# Patient Record
Sex: FEMALE | Race: WHITE | NOT HISPANIC OR LATINO | Employment: OTHER | URBAN - METROPOLITAN AREA
[De-identification: names, ages, dates, MRNs, and addresses within clinical notes are randomized per-mention and may not be internally consistent; named-entity substitution may affect disease eponyms.]

---

## 2023-08-12 ENCOUNTER — HOSPITAL ENCOUNTER (OUTPATIENT)
Dept: DATA CONVERSION | Facility: HOSPITAL | Age: 63
Discharge: HOME | End: 2023-08-12

## 2023-08-12 DIAGNOSIS — Z79.899 OTHER LONG TERM (CURRENT) DRUG THERAPY: ICD-10-CM

## 2023-08-12 DIAGNOSIS — R11.0 NAUSEA: ICD-10-CM

## 2023-08-12 DIAGNOSIS — K29.70 GASTRITIS, UNSPECIFIED, WITHOUT BLEEDING: ICD-10-CM

## 2023-08-12 DIAGNOSIS — Z98.61 CORONARY ANGIOPLASTY STATUS: ICD-10-CM

## 2023-08-12 DIAGNOSIS — R10.12 LEFT UPPER QUADRANT PAIN: ICD-10-CM

## 2023-08-12 DIAGNOSIS — G89.29 OTHER CHRONIC PAIN: ICD-10-CM

## 2023-08-12 DIAGNOSIS — R07.9 CHEST PAIN, UNSPECIFIED: ICD-10-CM

## 2023-08-12 LAB
ALBUMIN SERPL-MCNC: 4.4 GM/DL (ref 3.5–5)
ALBUMIN/GLOB SERPL: 1.5 RATIO (ref 1.5–3)
ALP BLD-CCNC: 79 U/L (ref 35–125)
ALT SERPL-CCNC: 5 U/L (ref 5–40)
ANION GAP SERPL CALCULATED.3IONS-SCNC: 10 MMOL/L (ref 0–19)
AST SERPL-CCNC: 21 U/L (ref 5–40)
BACTERIA SPEC CULT: NORMAL
BACTERIA UR QL AUTO: ABNORMAL
BASOPHILS # BLD AUTO: 0.06 K/UL (ref 0–0.22)
BASOPHILS NFR BLD AUTO: 0.8 % (ref 0–1)
BILIRUB SERPL-MCNC: 0.5 MG/DL (ref 0.1–1.2)
BILIRUB UR QL STRIP.AUTO: NEGATIVE
BUN SERPL-MCNC: 18 MG/DL (ref 8–25)
BUN/CREAT SERPL: 15 RATIO (ref 8–21)
CALCIUM SERPL-MCNC: 9.5 MG/DL (ref 8.5–10.4)
CC # UR: NORMAL /UL
CHLORIDE SERPL-SCNC: 102 MMOL/L (ref 97–107)
CLARITY UR: CLEAR
CO2 SERPL-SCNC: 26 MMOL/L (ref 24–31)
COLOR UR: COLORLESS
CREAT SERPL-MCNC: 1.2 MG/DL (ref 0.4–1.6)
DEPRECATED RDW RBC AUTO: 39.5 FL (ref 37–54)
DIFFERENTIAL METHOD BLD: ABNORMAL
EOSINOPHIL # BLD AUTO: 0.25 K/UL (ref 0–0.45)
EOSINOPHIL NFR BLD: 3.3 % (ref 0–3)
EPITH CASTS #/AREA UR COMP ASSIST: ABNORMAL /HPF
ERYTHROCYTE [DISTWIDTH] IN BLOOD BY AUTOMATED COUNT: 11.9 % (ref 11.7–15)
GFR SERPL CREATININE-BSD FRML MDRD: 51 ML/MIN/1.73 M2
GLOBULIN SER-MCNC: 2.9 G/DL (ref 1.9–3.7)
GLUCOSE SERPL-MCNC: 139 MG/DL (ref 65–99)
GLUCOSE UR STRIP.AUTO-MCNC: NEGATIVE MG/DL
HCT VFR BLD AUTO: 42.6 % (ref 36–44)
HGB BLD-MCNC: 14.4 GM/DL (ref 12–15)
HGB UR QL STRIP.AUTO: ABNORMAL /HPF (ref 0–3)
HGB UR QL: NEGATIVE
HS TROPONIN T DELTA: 1 (ref 0–4)
HS TROPONIN T DELTA: NORMAL (ref 0–4)
IMM GRANULOCYTES # BLD AUTO: 0.02 K/UL (ref 0–0.1)
KETONES UR QL STRIP.AUTO: NEGATIVE
LEUKOCYTE ESTERASE UR QL STRIP.AUTO: ABNORMAL
LIPASE SERPL-CCNC: 84 U/L (ref 16–63)
LYMPHOCYTES # BLD AUTO: 2.38 K/UL (ref 1.2–3.2)
LYMPHOCYTES NFR BLD MANUAL: 31.4 % (ref 20–40)
MCH RBC QN AUTO: 30.4 PG (ref 26–34)
MCHC RBC AUTO-ENTMCNC: 33.8 % (ref 31–37)
MCV RBC AUTO: 89.9 FL (ref 80–100)
MICROSCOPIC (UA): ABNORMAL
MONOCYTES # BLD AUTO: 0.57 K/UL (ref 0–0.8)
MONOCYTES NFR BLD MANUAL: 7.5 % (ref 0–8)
NEUTROPHILS # BLD AUTO: 4.31 K/UL
NEUTROPHILS # BLD AUTO: 4.31 K/UL (ref 1.8–7.7)
NEUTROPHILS.IMMATURE NFR BLD: 0.3 % (ref 0–1)
NEUTS SEG NFR BLD: 56.7 % (ref 50–70)
NITRITE UR QL STRIP.AUTO: NEGATIVE
NRBC BLD-RTO: 0 /100 WBC
PH UR STRIP.AUTO: 6 [PH] (ref 4.6–8)
PLATELET # BLD AUTO: 281 K/UL (ref 150–450)
PMV BLD AUTO: 11.5 CU (ref 7–12.6)
POTASSIUM SERPL-SCNC: 4.1 MMOL/L (ref 3.4–5.1)
PROT SERPL-MCNC: 7.3 G/DL (ref 5.9–7.9)
PROT UR STRIP.AUTO-MCNC: NEGATIVE MG/DL
RBC # BLD AUTO: 4.74 M/UL (ref 4–4.9)
REPORT STATUS -LH SQ DATA CONVERSION: NORMAL
SERVICE CMNT-IMP: NORMAL
SODIUM SERPL-SCNC: 138 MMOL/L (ref 133–145)
SP GR UR STRIP.AUTO: 1.01 (ref 1–1.03)
SPECIMEN SOURCE: NORMAL
TROPONIN T SERPL-MCNC: 6 NG/L
TROPONIN T SERPL-MCNC: 7 NG/L
URINE CULTURE: ABNORMAL
UROBILINOGEN UR QL STRIP.AUTO: NORMAL MG/DL (ref 0–1)
WBC # BLD AUTO: 7.6 K/UL (ref 4.5–11)
WBC #/AREA URNS AUTO: ABNORMAL /HPF (ref 0–3)

## 2023-10-26 PROBLEM — E53.8 COBALAMIN DEFICIENCY: Status: ACTIVE | Noted: 2019-12-10

## 2023-10-26 PROBLEM — R92.8 ABNORMAL MAMMOGRAM: Status: ACTIVE | Noted: 2020-11-27

## 2023-10-26 PROBLEM — L98.8 SKIN LESION OF BREAST: Status: ACTIVE | Noted: 2021-02-19

## 2023-10-26 PROBLEM — R22.9 LOCALIZED SOFT TISSUE SWELLING: Status: ACTIVE | Noted: 2019-06-07

## 2023-10-26 PROBLEM — J30.2 SEASONAL ALLERGIES: Status: ACTIVE | Noted: 2023-10-26

## 2023-10-26 PROBLEM — H66.93 BOM (BILATERAL OTITIS MEDIA): Status: ACTIVE | Noted: 2020-01-02

## 2023-10-26 PROBLEM — E78.5 HYPERLIPIDEMIA: Status: ACTIVE | Noted: 2023-10-26

## 2023-10-26 PROBLEM — M75.50 SUBSCAPULAR BURSITIS: Status: ACTIVE | Noted: 2022-02-14

## 2023-10-26 PROBLEM — N39.0 ACUTE LOWER URINARY TRACT INFECTION: Status: ACTIVE | Noted: 2023-10-26

## 2023-10-26 PROBLEM — R73.09 INCREASED GLUCOSE LEVEL: Status: ACTIVE | Noted: 2020-08-06

## 2023-10-26 PROBLEM — J02.9 SORE THROAT: Status: ACTIVE | Noted: 2020-01-02

## 2023-10-26 PROBLEM — E78.2 MIXED HYPERLIPIDEMIA: Status: ACTIVE | Noted: 2020-04-21

## 2023-10-26 PROBLEM — N39.0 ACUTE UTI: Status: ACTIVE | Noted: 2018-05-28

## 2023-10-26 PROBLEM — R07.81 RIB PAIN ON LEFT SIDE: Status: ACTIVE | Noted: 2021-03-03

## 2023-10-26 PROBLEM — M54.6 THORACIC BACK PAIN: Status: ACTIVE | Noted: 2022-02-14

## 2023-10-26 PROBLEM — M27.8 JAW MASS: Status: ACTIVE | Noted: 2022-10-04

## 2023-10-26 PROBLEM — M75.51 ACUTE BURSITIS OF RIGHT SHOULDER: Status: ACTIVE | Noted: 2022-02-15

## 2023-10-26 PROBLEM — M54.9 BACKACHE: Status: ACTIVE | Noted: 2023-10-26

## 2023-10-26 PROBLEM — E53.9 VITAMIN B DEFICIENCY: Status: ACTIVE | Noted: 2021-10-11

## 2023-10-26 PROBLEM — N89.8 VAGINAL DISCHARGE: Status: ACTIVE | Noted: 2021-08-04

## 2023-10-26 PROBLEM — S69.90XA INJURY OF WRIST: Status: ACTIVE | Noted: 2023-10-26

## 2023-10-26 PROBLEM — H10.9 BACTERIAL CONJUNCTIVITIS OF LEFT EYE: Status: ACTIVE | Noted: 2023-07-17

## 2023-10-26 PROBLEM — R05.9 COUGH: Status: ACTIVE | Noted: 2020-01-02

## 2023-10-26 PROBLEM — N76.2 VULVITIS: Status: ACTIVE | Noted: 2023-03-08

## 2023-10-26 PROBLEM — E66.9 OBESITY: Status: ACTIVE | Noted: 2023-10-26

## 2023-10-26 PROBLEM — M54.50 LUMBAR PAIN: Status: ACTIVE | Noted: 2021-03-03

## 2023-10-26 PROBLEM — R07.89 CHEST PAIN, ATYPICAL: Status: ACTIVE | Noted: 2023-10-26

## 2023-10-26 PROBLEM — J02.9 PHARYNGITIS: Status: ACTIVE | Noted: 2023-04-11

## 2023-10-26 PROBLEM — E55.9 VITAMIN D DEFICIENCY: Status: ACTIVE | Noted: 2019-12-10

## 2023-10-26 PROBLEM — M79.605 LEG PAIN, LEFT: Status: ACTIVE | Noted: 2021-08-04

## 2023-10-26 PROBLEM — J32.9 SINUSITIS: Status: ACTIVE | Noted: 2018-10-10

## 2023-10-26 PROBLEM — R82.90 ABNORMAL URINE FINDING: Status: ACTIVE | Noted: 2018-05-25

## 2023-10-26 PROBLEM — R79.0 LOW MAGNESIUM LEVELS: Status: ACTIVE | Noted: 2021-10-11

## 2023-10-26 PROBLEM — K52.9 ACUTE GASTROENTERITIS: Status: ACTIVE | Noted: 2018-05-28

## 2023-10-26 RX ORDER — TRIAMCINOLONE ACETONIDE 55 UG/1
1 SPRAY, METERED NASAL DAILY
COMMUNITY
Start: 2018-08-14 | End: 2023-12-15 | Stop reason: ALTCHOICE

## 2023-10-26 RX ORDER — OMEPRAZOLE 40 MG/1
CAPSULE, DELAYED RELEASE ORAL
COMMUNITY
Start: 2021-10-11 | End: 2024-03-04 | Stop reason: ALTCHOICE

## 2023-10-26 RX ORDER — OFLOXACIN 3 MG/ML
2 SOLUTION/ DROPS OPHTHALMIC 4 TIMES DAILY
COMMUNITY
Start: 2023-07-17 | End: 2023-12-15 | Stop reason: ALTCHOICE

## 2023-10-26 RX ORDER — CETIRIZINE HYDROCHLORIDE 10 MG/1
1 TABLET ORAL DAILY
COMMUNITY
Start: 2015-10-28 | End: 2024-03-04 | Stop reason: ALTCHOICE

## 2023-10-26 RX ORDER — ROSUVASTATIN CALCIUM 10 MG/1
10 TABLET, COATED ORAL DAILY
COMMUNITY
Start: 2023-04-26 | End: 2024-03-04 | Stop reason: SDUPTHER

## 2023-10-30 ENCOUNTER — OFFICE VISIT (OUTPATIENT)
Dept: PRIMARY CARE | Facility: CLINIC | Age: 63
End: 2023-10-30
Payer: COMMERCIAL

## 2023-10-30 VITALS
DIASTOLIC BLOOD PRESSURE: 100 MMHG | OXYGEN SATURATION: 97 % | BODY MASS INDEX: 31.98 KG/M2 | WEIGHT: 192.2 LBS | HEART RATE: 73 BPM | SYSTOLIC BLOOD PRESSURE: 156 MMHG

## 2023-10-30 DIAGNOSIS — I10 HYPERTENSION, UNSPECIFIED TYPE: Primary | ICD-10-CM

## 2023-10-30 PROCEDURE — 1036F TOBACCO NON-USER: CPT | Performed by: STUDENT IN AN ORGANIZED HEALTH CARE EDUCATION/TRAINING PROGRAM

## 2023-10-30 PROCEDURE — 3077F SYST BP >= 140 MM HG: CPT | Performed by: STUDENT IN AN ORGANIZED HEALTH CARE EDUCATION/TRAINING PROGRAM

## 2023-10-30 PROCEDURE — 99214 OFFICE O/P EST MOD 30 MIN: CPT | Performed by: STUDENT IN AN ORGANIZED HEALTH CARE EDUCATION/TRAINING PROGRAM

## 2023-10-30 PROCEDURE — 3080F DIAST BP >= 90 MM HG: CPT | Performed by: STUDENT IN AN ORGANIZED HEALTH CARE EDUCATION/TRAINING PROGRAM

## 2023-10-30 RX ORDER — LOSARTAN POTASSIUM 25 MG/1
25 TABLET ORAL DAILY
Qty: 30 TABLET | Refills: 1 | Status: SHIPPED | OUTPATIENT
Start: 2023-10-30 | End: 2023-11-15 | Stop reason: DRUGHIGH

## 2023-10-30 ASSESSMENT — PATIENT HEALTH QUESTIONNAIRE - PHQ9
2. FEELING DOWN, DEPRESSED OR HOPELESS: NOT AT ALL
SUM OF ALL RESPONSES TO PHQ9 QUESTIONS 1 AND 2: 0
1. LITTLE INTEREST OR PLEASURE IN DOING THINGS: NOT AT ALL

## 2023-10-30 ASSESSMENT — PAIN SCALES - GENERAL: PAINLEVEL: 0-NO PAIN

## 2023-10-30 NOTE — PROGRESS NOTES
GENERAL PROGRESS NOTE    Chief Complaint   Patient presents with    Hypertension     Went to dentist and states her BP was elevated        HPI  Nell Goff is a 63 y.o. female here today for BP check. Was seen at dentist office last week and was elevated 150/100. Has had some borderline BP's in office as well past few times. Does have off and on HA but no change in that at all. About 5-6 years ago was started on antihypertensives, not sure which one. Was on for a short time but BP went too low and was making dizzy so was stopped.     Vital signs   BP (!) 156/100   Pulse 73   Wt 87.2 kg (192 lb 3.2 oz)   SpO2 97%   BMI 31.98 kg/m²      Current Outpatient Medications   Medication Sig Dispense Refill    omeprazole (PriLOSEC) 40 mg DR capsule 0 Refill(s), Type: Maintenance      rosuvastatin (Crestor) 10 mg tablet Take 1 tablet (10 mg) by mouth once daily.      cetirizine (ZyrTEC) 10 mg tablet Take 1 tablet (10 mg) by mouth once daily.      ofloxacin (Ocuflox) 0.3 % ophthalmic solution Administer 2 drops into the left eye 4 times a day.      triamcinolone (Nasacort) 55 mcg nasal inhaler Administer 1 spray into each nostril once daily.       No current facility-administered medications for this visit.       Review of Systems   Constitutional:  Negative for chills and fever.   Eyes:  Negative for visual disturbance.   Respiratory:  Negative for shortness of breath.    Cardiovascular:  Negative for chest pain, palpitations and leg swelling.   Neurological:  Negative for dizziness and headaches.        Physical Exam  Vitals and nursing note reviewed.   Constitutional:       General: She is not in acute distress.     Appearance: She is not ill-appearing.   Cardiovascular:      Rate and Rhythm: Normal rate and regular rhythm.      Heart sounds: Normal heart sounds. No murmur heard.  Pulmonary:       Effort: Pulmonary effort is normal. No respiratory distress.      Breath sounds: Normal breath sounds. No wheezing, rhonchi or rales.   Musculoskeletal:      Right lower leg: No edema.      Left lower leg: No edema.         Assessment/Plan   1. Hypertension, unspecified type  Needs better control. Add losartan. Medication indications, use and potential side effects discussed in detail. Begin BP monitoring at home, proper technique reviewed. Follow up 2 weeks for BP check.   - Miscellaneous DME  - losartan (Cozaar) 25 mg tablet; Take 1 tablet (25 mg) by mouth once daily.  Dispense: 30 tablet; Refill: 1  - Basic metabolic panel; Future      Mariah Bates MD  10/30/23  3:02 PM

## 2023-11-06 ASSESSMENT — ENCOUNTER SYMPTOMS
SHORTNESS OF BREATH: 0
PALPITATIONS: 0
CHILLS: 0
FEVER: 0
DIZZINESS: 0
HEADACHES: 0

## 2023-11-15 ENCOUNTER — OFFICE VISIT (OUTPATIENT)
Dept: PRIMARY CARE | Facility: CLINIC | Age: 63
End: 2023-11-15
Payer: COMMERCIAL

## 2023-11-15 VITALS
OXYGEN SATURATION: 97 % | BODY MASS INDEX: 31.75 KG/M2 | WEIGHT: 190.8 LBS | DIASTOLIC BLOOD PRESSURE: 96 MMHG | HEART RATE: 76 BPM | SYSTOLIC BLOOD PRESSURE: 142 MMHG

## 2023-11-15 DIAGNOSIS — I10 HYPERTENSION, UNSPECIFIED TYPE: Primary | ICD-10-CM

## 2023-11-15 PROCEDURE — 3077F SYST BP >= 140 MM HG: CPT | Performed by: STUDENT IN AN ORGANIZED HEALTH CARE EDUCATION/TRAINING PROGRAM

## 2023-11-15 PROCEDURE — 99214 OFFICE O/P EST MOD 30 MIN: CPT | Performed by: STUDENT IN AN ORGANIZED HEALTH CARE EDUCATION/TRAINING PROGRAM

## 2023-11-15 PROCEDURE — 1036F TOBACCO NON-USER: CPT | Performed by: STUDENT IN AN ORGANIZED HEALTH CARE EDUCATION/TRAINING PROGRAM

## 2023-11-15 PROCEDURE — 3080F DIAST BP >= 90 MM HG: CPT | Performed by: STUDENT IN AN ORGANIZED HEALTH CARE EDUCATION/TRAINING PROGRAM

## 2023-11-15 RX ORDER — LOSARTAN POTASSIUM 50 MG/1
50 TABLET ORAL DAILY
Qty: 90 TABLET | Refills: 1 | Status: SHIPPED | OUTPATIENT
Start: 2023-11-15 | End: 2024-05-29

## 2023-11-15 ASSESSMENT — ENCOUNTER SYMPTOMS
COUGH: 0
DIFFICULTY URINATING: 0
BLOOD IN STOOL: 0
WEAKNESS: 0
WHEEZING: 0
CONSTIPATION: 0
CHILLS: 0
TROUBLE SWALLOWING: 0
FEVER: 0
DIZZINESS: 0
LIGHT-HEADEDNESS: 0
HEADACHES: 0
ABDOMINAL PAIN: 0
SHORTNESS OF BREATH: 0
CHEST TIGHTNESS: 0
PALPITATIONS: 0
DIARRHEA: 0

## 2023-11-15 ASSESSMENT — PATIENT HEALTH QUESTIONNAIRE - PHQ9
SUM OF ALL RESPONSES TO PHQ9 QUESTIONS 1 AND 2: 0
2. FEELING DOWN, DEPRESSED OR HOPELESS: NOT AT ALL
1. LITTLE INTEREST OR PLEASURE IN DOING THINGS: NOT AT ALL

## 2023-11-15 ASSESSMENT — PAIN SCALES - GENERAL: PAINLEVEL: 0-NO PAIN

## 2023-11-15 NOTE — PROGRESS NOTES
GENERAL PROGRESS NOTE    Chief Complaint   Patient presents with    med review       HPI  Nell Goff is a 63 y.o. female that presents today for HTN follow up. She was last seen 10/30/2023 for elevated BP at outside provider office. Losartan 25mg was added at that time. She returns today for follow up. BP still elevated at 142/96. She is tolerating medication well without any side effects. Has only checked blood pressure at home once since last visit and was elevated about 150/90's. Dentist will not see her back until BP under 140/90.     Vital signs   BP (!) 142/96   Pulse 76   Wt 86.5 kg (190 lb 12.8 oz)   SpO2 97%   BMI 31.75 kg/m²      Current Outpatient Medications   Medication Sig Dispense Refill    cetirizine (ZyrTEC) 10 mg tablet Take 1 tablet (10 mg) by mouth once daily.      losartan (Cozaar) 25 mg tablet Take 1 tablet (25 mg) by mouth once daily. 30 tablet 1    ofloxacin (Ocuflox) 0.3 % ophthalmic solution Administer 2 drops into the left eye 4 times a day.      omeprazole (PriLOSEC) 40 mg DR capsule 0 Refill(s), Type: Maintenance      rosuvastatin (Crestor) 10 mg tablet Take 1 tablet (10 mg) by mouth once daily.      triamcinolone (Nasacort) 55 mcg nasal inhaler Administer 1 spray into each nostril once daily.       No current facility-administered medications for this visit.       Review of Systems   Constitutional:  Negative for chills and fever.   HENT:  Negative for trouble swallowing.    Eyes:  Negative for visual disturbance.   Respiratory:  Negative for cough, chest tightness, shortness of breath and wheezing.    Cardiovascular:  Negative for chest pain and palpitations.   Gastrointestinal:  Negative for abdominal pain, blood in stool, constipation and diarrhea.   Genitourinary:  Negative for difficulty urinating, vaginal bleeding, vaginal discharge and vaginal pain.    Neurological:  Negative for dizziness, weakness, light-headedness and headaches.        Physical Exam  Vitals and nursing note reviewed.   Constitutional:       General: She is not in acute distress.     Appearance: She is not ill-appearing.   Cardiovascular:      Rate and Rhythm: Normal rate and regular rhythm.      Heart sounds: Normal heart sounds. No murmur heard.  Pulmonary:      Effort: Pulmonary effort is normal. No respiratory distress.      Breath sounds: Normal breath sounds. No wheezing, rhonchi or rales.   Musculoskeletal:      Right lower leg: No edema.      Left lower leg: No edema.         Assessment/Plan   1. Hypertension, unspecified type  Needs better control. Increase losartan to 50mg. She will call in 2 weeks with update on her home readings. If not significantly improved will titrate medication. Follow up in office 4 weeks.   - losartan (Cozaar) 50 mg tablet; Take 1 tablet (50 mg) by mouth once daily.  Dispense: 90 tablet; Refill: 1      No orders of the defined types were placed in this encounter.       Mariah Bates MD  11/15/23  2:49 PM

## 2023-11-15 NOTE — PATIENT INSTRUCTIONS
Get your blood drawn at the lab before your next visit.   Call in 2 weeks with blood pressure readings   Follow up in office 4 weeks

## 2023-11-22 ENCOUNTER — TELEPHONE (OUTPATIENT)
Dept: PRIMARY CARE | Facility: CLINIC | Age: 63
End: 2023-11-22

## 2023-11-22 ENCOUNTER — OFFICE VISIT (OUTPATIENT)
Dept: PRIMARY CARE | Facility: CLINIC | Age: 63
End: 2023-11-22
Payer: COMMERCIAL

## 2023-11-22 VITALS
WEIGHT: 190 LBS | HEART RATE: 77 BPM | SYSTOLIC BLOOD PRESSURE: 138 MMHG | OXYGEN SATURATION: 98 % | DIASTOLIC BLOOD PRESSURE: 90 MMHG | BODY MASS INDEX: 31.62 KG/M2

## 2023-11-22 DIAGNOSIS — R10.33 PERIUMBILICAL PAIN: Primary | ICD-10-CM

## 2023-11-22 DIAGNOSIS — E78.5 HYPERLIPIDEMIA, UNSPECIFIED HYPERLIPIDEMIA TYPE: ICD-10-CM

## 2023-11-22 DIAGNOSIS — E55.9 VITAMIN D DEFICIENCY: ICD-10-CM

## 2023-11-22 DIAGNOSIS — I10 HYPERTENSION, UNSPECIFIED TYPE: ICD-10-CM

## 2023-11-22 DIAGNOSIS — E53.9 VITAMIN B DEFICIENCY: ICD-10-CM

## 2023-11-22 DIAGNOSIS — E66.9 OBESITY, UNSPECIFIED CLASSIFICATION, UNSPECIFIED OBESITY TYPE, UNSPECIFIED WHETHER SERIOUS COMORBIDITY PRESENT: ICD-10-CM

## 2023-11-22 PROCEDURE — 1036F TOBACCO NON-USER: CPT | Performed by: STUDENT IN AN ORGANIZED HEALTH CARE EDUCATION/TRAINING PROGRAM

## 2023-11-22 PROCEDURE — 99213 OFFICE O/P EST LOW 20 MIN: CPT | Performed by: STUDENT IN AN ORGANIZED HEALTH CARE EDUCATION/TRAINING PROGRAM

## 2023-11-22 ASSESSMENT — ENCOUNTER SYMPTOMS
CHEST TIGHTNESS: 0
FEVER: 0
DYSURIA: 0
FATIGUE: 0
NAUSEA: 0
SHORTNESS OF BREATH: 0
CHILLS: 0
ABDOMINAL PAIN: 0
BLOOD IN STOOL: 0
DIARRHEA: 0
FREQUENCY: 0
RECTAL PAIN: 0
VOMITING: 0
CONSTIPATION: 0
HEMATURIA: 0

## 2023-11-22 ASSESSMENT — PAIN SCALES - GENERAL: PAINLEVEL: 2

## 2023-11-22 NOTE — PROGRESS NOTES
GENERAL PROGRESS NOTE    Chief Complaint   Patient presents with    Belly button pain      Thinks she may have a hernia        HPI  Nell Goff is a 63 y.o. female  that presents today for pain in area of navel over past week. Says always present to some degree, enough to be aware of it but not severe or limiting activity. Intensity varies. Worse with coughing. Has not noted anything.   No recent illnesses, no fever/chills. Having normal bowel movement but does have a little pain in area if has to strain. Does not think she has changed activity recently but was thinking possibly it was a hernia or pulled muscle. She says she was told  at some point awhile back that she had a hernia but it was higher up from what she remembers and was told nothing to worry about. Has never had abdominal surgery.     Vital signs   /90   Pulse 77   Wt 86.2 kg (190 lb)   SpO2 98%   BMI 31.62 kg/m²      Current Outpatient Medications   Medication Sig Dispense Refill    cetirizine (ZyrTEC) 10 mg tablet Take 1 tablet (10 mg) by mouth once daily.      losartan (Cozaar) 50 mg tablet Take 1 tablet (50 mg) by mouth once daily. 90 tablet 1    ofloxacin (Ocuflox) 0.3 % ophthalmic solution Administer 2 drops into the left eye 4 times a day.      omeprazole (PriLOSEC) 40 mg DR capsule 0 Refill(s), Type: Maintenance      rosuvastatin (Crestor) 10 mg tablet Take 1 tablet (10 mg) by mouth once daily.      triamcinolone (Nasacort) 55 mcg nasal inhaler Administer 1 spray into each nostril once daily.       No current facility-administered medications for this visit.       Review of Systems   Constitutional:  Negative for chills, fatigue and fever.   Respiratory:  Negative for chest tightness and shortness of breath.    Cardiovascular:  Negative for chest pain.   Gastrointestinal:  Negative for abdominal pain (see HPI), blood  in stool, constipation, diarrhea, nausea, rectal pain and vomiting.   Genitourinary:  Negative for dysuria, frequency, hematuria, vaginal bleeding, vaginal discharge and vaginal pain.        Physical Exam  Constitutional:       General: She is not in acute distress.     Appearance: Normal appearance.   Cardiovascular:      Rate and Rhythm: Normal rate and regular rhythm.   Pulmonary:      Effort: Pulmonary effort is normal.      Breath sounds: Normal breath sounds. No wheezing, rhonchi or rales.   Abdominal:      General: Bowel sounds are normal. There is no distension.      Palpations: Abdomen is soft. There is no mass.      Tenderness: Tenderness: mild periumbilcal tenderness. There is no right CVA tenderness, left CVA tenderness, guarding or rebound.      Hernia: No hernia is present.   Skin:     General: Skin is warm and dry.   Neurological:      Mental Status: She is alert.         Assessment/Plan   1. Periumbilical pain  Non-acute abdomen on exam. No mass or hernia palpated. Discussed potential etiologies including muscle strain, hernia, diverticulitis, constipation. Unclear etiology but at this time would favor abdominal muscle strain vs mild diverticulitis. Will monitor closely, return/ED precautions reviewed in detail. No indication for imaging at time but would consider if persists or worsens.        Mariah Bates MD  11/22/23  8:09 AM

## 2023-12-15 ENCOUNTER — OFFICE VISIT (OUTPATIENT)
Dept: PRIMARY CARE | Facility: CLINIC | Age: 63
End: 2023-12-15
Payer: COMMERCIAL

## 2023-12-15 ENCOUNTER — TELEPHONE (OUTPATIENT)
Dept: PRIMARY CARE | Facility: CLINIC | Age: 63
End: 2023-12-15

## 2023-12-15 VITALS
WEIGHT: 189 LBS | DIASTOLIC BLOOD PRESSURE: 72 MMHG | SYSTOLIC BLOOD PRESSURE: 130 MMHG | OXYGEN SATURATION: 96 % | HEART RATE: 84 BPM | BODY MASS INDEX: 31.45 KG/M2

## 2023-12-15 DIAGNOSIS — E78.5 HYPERLIPIDEMIA, UNSPECIFIED HYPERLIPIDEMIA TYPE: ICD-10-CM

## 2023-12-15 DIAGNOSIS — I10 HYPERTENSION, UNSPECIFIED TYPE: Primary | ICD-10-CM

## 2023-12-15 DIAGNOSIS — Z12.31 ENCOUNTER FOR SCREENING MAMMOGRAM FOR MALIGNANT NEOPLASM OF BREAST: ICD-10-CM

## 2023-12-15 PROCEDURE — 3075F SYST BP GE 130 - 139MM HG: CPT | Performed by: STUDENT IN AN ORGANIZED HEALTH CARE EDUCATION/TRAINING PROGRAM

## 2023-12-15 PROCEDURE — 99213 OFFICE O/P EST LOW 20 MIN: CPT | Performed by: STUDENT IN AN ORGANIZED HEALTH CARE EDUCATION/TRAINING PROGRAM

## 2023-12-15 PROCEDURE — 3078F DIAST BP <80 MM HG: CPT | Performed by: STUDENT IN AN ORGANIZED HEALTH CARE EDUCATION/TRAINING PROGRAM

## 2023-12-15 PROCEDURE — 1036F TOBACCO NON-USER: CPT | Performed by: STUDENT IN AN ORGANIZED HEALTH CARE EDUCATION/TRAINING PROGRAM

## 2023-12-15 ASSESSMENT — PATIENT HEALTH QUESTIONNAIRE - PHQ9
1. LITTLE INTEREST OR PLEASURE IN DOING THINGS: NOT AT ALL
SUM OF ALL RESPONSES TO PHQ9 QUESTIONS 1 AND 2: 0
2. FEELING DOWN, DEPRESSED OR HOPELESS: NOT AT ALL

## 2023-12-15 ASSESSMENT — PAIN SCALES - GENERAL: PAINLEVEL: 3

## 2023-12-15 NOTE — PROGRESS NOTES
GENERAL PROGRESS NOTE    Chief Complaint   Patient presents with    Follow-up     Hypertension check       HPI  Nell Goff is a 63 y.o. female that presents today for HTN follow up.     Medication - Losartan 50mg  BP today-  130/72  Home readings - not checking too frequently but has been in normal range when does check. Tolerating medication well, no side effects.       Vital signs   /72   Pulse 84   Wt 85.7 kg (189 lb)   SpO2 96%   BMI 31.45 kg/m²      Current Outpatient Medications   Medication Sig Dispense Refill    cetirizine (ZyrTEC) 10 mg tablet Take 1 tablet (10 mg) by mouth once daily.      losartan (Cozaar) 50 mg tablet Take 1 tablet (50 mg) by mouth once daily. 90 tablet 1    ofloxacin (Ocuflox) 0.3 % ophthalmic solution Administer 2 drops into the left eye 4 times a day.      omeprazole (PriLOSEC) 40 mg DR capsule 0 Refill(s), Type: Maintenance      rosuvastatin (Crestor) 10 mg tablet Take 1 tablet (10 mg) by mouth once daily.      triamcinolone (Nasacort) 55 mcg nasal inhaler Administer 1 spray into each nostril once daily.       No current facility-administered medications for this visit.       Review of Systems   Constitutional:  Negative for chills and fever.   Eyes:  Negative for visual disturbance.   Respiratory:  Negative for shortness of breath.    Cardiovascular:  Negative for chest pain, palpitations and leg swelling.   Neurological:  Negative for dizziness and headaches.        Physical Exam  Vitals and nursing note reviewed.   Constitutional:       General: She is not in acute distress.     Appearance: She is not ill-appearing.   Cardiovascular:      Rate and Rhythm: Normal rate and regular rhythm.      Heart sounds: Normal heart sounds. No murmur heard.  Pulmonary:      Effort: Pulmonary effort is normal. No respiratory distress.      Breath sounds: Normal breath  sounds. No wheezing, rhonchi or rales.   Musculoskeletal:      Right lower leg: No edema.      Left lower leg: No edema.         Assessment/Plan   1. Hypertension, unspecified type  Well controlled on current meds, no indication to change any meds at this time  Stable renal function  Repeat in 6mo      2. Encounter for screening mammogram for malignant neoplasm of breast  - BI mammo bilateral screening tomosynthesis; Future    Mariah Bates MD  12/14/23  10:34 PM

## 2023-12-22 ASSESSMENT — ENCOUNTER SYMPTOMS
HEADACHES: 0
DIZZINESS: 0
PALPITATIONS: 0
CHILLS: 0
SHORTNESS OF BREATH: 0
FEVER: 0

## 2024-01-03 ENCOUNTER — APPOINTMENT (OUTPATIENT)
Dept: PRIMARY CARE | Facility: CLINIC | Age: 64
End: 2024-01-03
Payer: COMMERCIAL

## 2024-01-03 ENCOUNTER — OFFICE VISIT (OUTPATIENT)
Dept: PRIMARY CARE | Facility: CLINIC | Age: 64
End: 2024-01-03
Payer: COMMERCIAL

## 2024-01-03 VITALS
HEIGHT: 65 IN | WEIGHT: 188 LBS | HEART RATE: 71 BPM | SYSTOLIC BLOOD PRESSURE: 126 MMHG | OXYGEN SATURATION: 96 % | DIASTOLIC BLOOD PRESSURE: 78 MMHG | TEMPERATURE: 98.4 F | BODY MASS INDEX: 31.32 KG/M2

## 2024-01-03 DIAGNOSIS — R39.9 UTI SYMPTOMS: ICD-10-CM

## 2024-01-03 DIAGNOSIS — N30.90 CYSTITIS: Primary | ICD-10-CM

## 2024-01-03 LAB
POC APPEARANCE, URINE: CLEAR
POC BILIRUBIN, URINE: NEGATIVE
POC BLOOD, URINE: NEGATIVE
POC COLOR, URINE: YELLOW
POC GLUCOSE, URINE: NEGATIVE MG/DL
POC KETONES, URINE: NEGATIVE MG/DL
POC LEUKOCYTES, URINE: ABNORMAL
POC NITRITE,URINE: NEGATIVE
POC PH, URINE: 6.5 PH
POC PROTEIN, URINE: NEGATIVE MG/DL
POC SPECIFIC GRAVITY, URINE: 1.01
POC UROBILINOGEN, URINE: 0.2 EU/DL

## 2024-01-03 PROCEDURE — 1036F TOBACCO NON-USER: CPT

## 2024-01-03 PROCEDURE — 99213 OFFICE O/P EST LOW 20 MIN: CPT

## 2024-01-03 PROCEDURE — 81003 URINALYSIS AUTO W/O SCOPE: CPT

## 2024-01-03 PROCEDURE — 87086 URINE CULTURE/COLONY COUNT: CPT

## 2024-01-03 RX ORDER — NITROFURANTOIN 25; 75 MG/1; MG/1
100 CAPSULE ORAL 2 TIMES DAILY
Qty: 10 CAPSULE | Refills: 0 | Status: SHIPPED | OUTPATIENT
Start: 2024-01-03 | End: 2024-01-08

## 2024-01-03 ASSESSMENT — PAIN SCALES - GENERAL: PAINLEVEL: 3

## 2024-01-03 ASSESSMENT — ENCOUNTER SYMPTOMS
HEMATURIA: 0
DIFFICULTY URINATING: 0
FEVER: 0
ACTIVITY CHANGE: 0
CHILLS: 0
SWEATS: 0
FATIGUE: 0
FLANK PAIN: 1
APPETITE CHANGE: 0
FREQUENCY: 1
DYSURIA: 0

## 2024-01-03 NOTE — PROGRESS NOTES
"Subjective   Patient ID: Nell Goff is a 63 y.o. female who presents for Urinary Frequency and Urinary Problem.    Urinary Frequency   This is a new problem. Episode onset: 3-4 days ago. The problem occurs every urination. The problem has been unchanged. The pain is at a severity of 3/10. There has been no fever. She is Sexually active. There is No history of pyelonephritis. Associated symptoms include flank pain, frequency, hesitancy and urgency. Pertinent negatives include no chills, discharge, hematuria or sweats. She has tried acetaminophen for the symptoms. Her past medical history is significant for recurrent UTIs. There is no history of kidney stones or a urological procedure.        Review of Systems   Constitutional:  Negative for activity change, appetite change, chills, fatigue and fever.   Genitourinary:  Positive for flank pain, frequency, hesitancy and urgency. Negative for decreased urine volume, difficulty urinating, dysuria and hematuria.       Objective   Blood Pressure 126/78 (BP Location: Left arm)   Pulse 71   Temperature 36.9 °C (98.4 °F) (Temporal)   Height 1.651 m (5' 5\")   Weight 85.3 kg (188 lb)   Oxygen Saturation 96%   Body Mass Index 31.28 kg/m²     Physical Exam  Vitals and nursing note reviewed.   Constitutional:       General: She is not in acute distress.     Appearance: Normal appearance. She is normal weight.   Cardiovascular:      Rate and Rhythm: Normal rate and regular rhythm.      Heart sounds: Normal heart sounds, S1 normal and S2 normal.   Pulmonary:      Effort: Pulmonary effort is normal.      Breath sounds: Normal breath sounds.   Abdominal:      General: Abdomen is flat. Bowel sounds are normal. There is no distension.      Palpations: Abdomen is soft. There is no hepatomegaly or splenomegaly.      Tenderness: There is abdominal tenderness in the suprapubic area. There is no right CVA tenderness, left CVA tenderness, guarding or rebound.   Skin:     General: " Skin is warm and dry.      Capillary Refill: Capillary refill takes less than 2 seconds.   Neurological:      General: No focal deficit present.      Mental Status: She is alert.         Assessment/Plan   Problem List Items Addressed This Visit    None  Visit Diagnoses       Diagnosis Codes    Cystitis    -  Primary  Acute.  Urine dipstick: +leukocytes  Urine sent for C & S, will call with results and adjust treatment accordingly.  Start ATB - please complete full course unless you hear otherwise from our office.  Discussed indication for antibiotic use, administration instructions, and adverse effects with patient.  Increase your fluid intake, Tylenol or Motrin as needed for pain or fever.  May use OTC AZO/UROSTAT/Cystex for symptoms relief if desired.   Discussed hygiene for prevention.  Call our office to report and new fever/chills or back pain.  Follow up in 1 week if symptoms persist.    N30.90    Relevant Medications    nitrofurantoin, macrocrystal-monohydrate, (Macrobid) 100 mg capsule    UTI symptoms     R39.9    Relevant Orders    POCT UA Automated manually resulted (Completed)    Urine Culture

## 2024-01-04 ENCOUNTER — TELEPHONE (OUTPATIENT)
Dept: PRIMARY CARE | Facility: CLINIC | Age: 64
End: 2024-01-04
Payer: COMMERCIAL

## 2024-01-04 DIAGNOSIS — N30.90 CYSTITIS: Primary | ICD-10-CM

## 2024-01-04 LAB — BACTERIA UR CULT: NO GROWTH

## 2024-01-04 RX ORDER — CEPHALEXIN 500 MG/1
500 CAPSULE ORAL 2 TIMES DAILY
Qty: 14 CAPSULE | Refills: 0 | Status: SHIPPED | OUTPATIENT
Start: 2024-01-04 | End: 2024-01-11

## 2024-01-04 NOTE — TELEPHONE ENCOUNTER
PT WAS SEEN 1/3 WITH MICHELLE FOR UTI, WAS GIVEN RX AND IT IS MAKING HER HAVE DIARRHEA, DIZZY AND STOMACH CRAMPS.  SHE WOULD LIKE IT CHANGED TO CEFALEXIN.   PHARMACY IS [facility].

## 2024-01-10 ENCOUNTER — HOSPITAL ENCOUNTER (OUTPATIENT)
Dept: RADIOLOGY | Facility: HOSPITAL | Age: 64
Discharge: HOME | End: 2024-01-10
Payer: COMMERCIAL

## 2024-01-10 VITALS — WEIGHT: 185 LBS | BODY MASS INDEX: 31.58 KG/M2 | HEIGHT: 64 IN

## 2024-01-10 DIAGNOSIS — Z12.31 ENCOUNTER FOR SCREENING MAMMOGRAM FOR MALIGNANT NEOPLASM OF BREAST: ICD-10-CM

## 2024-01-10 PROCEDURE — 77067 SCR MAMMO BI INCL CAD: CPT

## 2024-01-15 ENCOUNTER — APPOINTMENT (OUTPATIENT)
Dept: PRIMARY CARE | Facility: CLINIC | Age: 64
End: 2024-01-15
Payer: COMMERCIAL

## 2024-02-02 ENCOUNTER — TELEPHONE (OUTPATIENT)
Dept: PRIMARY CARE | Facility: CLINIC | Age: 64
End: 2024-02-02
Payer: COMMERCIAL

## 2024-02-02 DIAGNOSIS — E55.9 VITAMIN D DEFICIENCY: ICD-10-CM

## 2024-02-02 DIAGNOSIS — E53.8 COBALAMIN DEFICIENCY: ICD-10-CM

## 2024-02-02 DIAGNOSIS — E53.9 VITAMIN B DEFICIENCY: ICD-10-CM

## 2024-02-02 DIAGNOSIS — R73.09 INCREASED GLUCOSE LEVEL: ICD-10-CM

## 2024-02-02 DIAGNOSIS — Z00.00 PREVENTATIVE HEALTH CARE: ICD-10-CM

## 2024-02-02 DIAGNOSIS — E78.2 MIXED HYPERLIPIDEMIA: ICD-10-CM

## 2024-02-22 ENCOUNTER — APPOINTMENT (OUTPATIENT)
Dept: UROLOGY | Facility: CLINIC | Age: 64
End: 2024-02-22
Payer: COMMERCIAL

## 2024-02-29 ENCOUNTER — LAB (OUTPATIENT)
Dept: LAB | Facility: LAB | Age: 64
End: 2024-02-29
Payer: COMMERCIAL

## 2024-02-29 DIAGNOSIS — E53.9 VITAMIN B DEFICIENCY: ICD-10-CM

## 2024-02-29 DIAGNOSIS — Z00.00 PREVENTATIVE HEALTH CARE: ICD-10-CM

## 2024-02-29 DIAGNOSIS — E78.2 MIXED HYPERLIPIDEMIA: ICD-10-CM

## 2024-02-29 DIAGNOSIS — E55.9 VITAMIN D DEFICIENCY: ICD-10-CM

## 2024-02-29 DIAGNOSIS — E53.8 COBALAMIN DEFICIENCY: ICD-10-CM

## 2024-02-29 DIAGNOSIS — R73.09 INCREASED GLUCOSE LEVEL: ICD-10-CM

## 2024-02-29 LAB
ALBUMIN SERPL BCP-MCNC: 3.8 G/DL (ref 3.4–5)
ALP SERPL-CCNC: 81 U/L (ref 33–136)
ALT SERPL W P-5'-P-CCNC: 3 U/L (ref 7–45)
ANION GAP SERPL CALC-SCNC: 11 MMOL/L (ref 10–20)
APPEARANCE UR: ABNORMAL
AST SERPL W P-5'-P-CCNC: 27 U/L (ref 9–39)
BACTERIA #/AREA URNS AUTO: ABNORMAL /HPF
BASOPHILS # BLD AUTO: 0.06 X10*3/UL (ref 0–0.1)
BASOPHILS NFR BLD AUTO: 1.1 %
BILIRUB SERPL-MCNC: 0.7 MG/DL (ref 0–1.2)
BILIRUB UR STRIP.AUTO-MCNC: NEGATIVE MG/DL
BUN SERPL-MCNC: 10 MG/DL (ref 6–23)
CALCIUM SERPL-MCNC: 8.9 MG/DL (ref 8.6–10.3)
CHLORIDE SERPL-SCNC: 103 MMOL/L (ref 98–107)
CHOLEST SERPL-MCNC: 126 MG/DL (ref 0–199)
CHOLESTEROL/HDL RATIO: 2.6
CO2 SERPL-SCNC: 30 MMOL/L (ref 21–32)
COLOR UR: YELLOW
CREAT SERPL-MCNC: 0.8 MG/DL (ref 0.5–1.05)
EGFRCR SERPLBLD CKD-EPI 2021: 83 ML/MIN/1.73M*2
EOSINOPHIL # BLD AUTO: 0.23 X10*3/UL (ref 0–0.7)
EOSINOPHIL NFR BLD AUTO: 4.4 %
ERYTHROCYTE [DISTWIDTH] IN BLOOD BY AUTOMATED COUNT: 12.3 % (ref 11.5–14.5)
GLUCOSE SERPL-MCNC: 100 MG/DL (ref 74–99)
GLUCOSE UR STRIP.AUTO-MCNC: NEGATIVE MG/DL
HCT VFR BLD AUTO: 42.8 % (ref 36–46)
HDLC SERPL-MCNC: 48.6 MG/DL
HGB BLD-MCNC: 13.5 G/DL (ref 12–16)
IMM GRANULOCYTES # BLD AUTO: 0.01 X10*3/UL (ref 0–0.7)
IMM GRANULOCYTES NFR BLD AUTO: 0.2 % (ref 0–0.9)
KETONES UR STRIP.AUTO-MCNC: NEGATIVE MG/DL
LDLC SERPL CALC-MCNC: 49 MG/DL
LEUKOCYTE ESTERASE UR QL STRIP.AUTO: ABNORMAL
LYMPHOCYTES # BLD AUTO: 2.58 X10*3/UL (ref 1.2–4.8)
LYMPHOCYTES NFR BLD AUTO: 49.3 %
MCH RBC QN AUTO: 29.9 PG (ref 26–34)
MCHC RBC AUTO-ENTMCNC: 31.5 G/DL (ref 32–36)
MCV RBC AUTO: 95 FL (ref 80–100)
MONOCYTES # BLD AUTO: 0.44 X10*3/UL (ref 0.1–1)
MONOCYTES NFR BLD AUTO: 8.4 %
MUCOUS THREADS #/AREA URNS AUTO: ABNORMAL /LPF
NEUTROPHILS # BLD AUTO: 1.91 X10*3/UL (ref 1.2–7.7)
NEUTROPHILS NFR BLD AUTO: 36.6 %
NITRITE UR QL STRIP.AUTO: NEGATIVE
NON HDL CHOLESTEROL: 77 MG/DL (ref 0–149)
NRBC BLD-RTO: 0 /100 WBCS (ref 0–0)
PH UR STRIP.AUTO: 7 [PH]
PLATELET # BLD AUTO: 280 X10*3/UL (ref 150–450)
POTASSIUM SERPL-SCNC: 4.4 MMOL/L (ref 3.5–5.3)
PROT SERPL-MCNC: 6.2 G/DL (ref 6.4–8.2)
PROT UR STRIP.AUTO-MCNC: NEGATIVE MG/DL
RBC # BLD AUTO: 4.51 X10*6/UL (ref 4–5.2)
RBC # UR STRIP.AUTO: NEGATIVE /UL
RBC #/AREA URNS AUTO: ABNORMAL /HPF
SODIUM SERPL-SCNC: 140 MMOL/L (ref 136–145)
SP GR UR STRIP.AUTO: 1.01
SQUAMOUS #/AREA URNS AUTO: ABNORMAL /HPF
TRIGL SERPL-MCNC: 141 MG/DL (ref 0–149)
UROBILINOGEN UR STRIP.AUTO-MCNC: <2 MG/DL
VLDL: 28 MG/DL (ref 0–40)
WBC # BLD AUTO: 5.2 X10*3/UL (ref 4.4–11.3)
WBC #/AREA URNS AUTO: ABNORMAL /HPF

## 2024-02-29 PROCEDURE — 80061 LIPID PANEL: CPT

## 2024-02-29 PROCEDURE — 36415 COLL VENOUS BLD VENIPUNCTURE: CPT

## 2024-02-29 PROCEDURE — 82607 VITAMIN B-12: CPT

## 2024-02-29 PROCEDURE — 83036 HEMOGLOBIN GLYCOSYLATED A1C: CPT

## 2024-02-29 PROCEDURE — 82306 VITAMIN D 25 HYDROXY: CPT

## 2024-02-29 PROCEDURE — 85025 COMPLETE CBC W/AUTO DIFF WBC: CPT

## 2024-02-29 PROCEDURE — 80053 COMPREHEN METABOLIC PANEL: CPT

## 2024-02-29 PROCEDURE — 81001 URINALYSIS AUTO W/SCOPE: CPT

## 2024-03-01 LAB
25(OH)D3 SERPL-MCNC: 51 NG/ML (ref 30–100)
EST. AVERAGE GLUCOSE BLD GHB EST-MCNC: 123 MG/DL
HBA1C MFR BLD: 5.9 %
VIT B12 SERPL-MCNC: 1230 PG/ML (ref 211–911)

## 2024-03-04 ENCOUNTER — OFFICE VISIT (OUTPATIENT)
Dept: PRIMARY CARE | Facility: CLINIC | Age: 64
End: 2024-03-04
Payer: COMMERCIAL

## 2024-03-04 ENCOUNTER — TELEPHONE (OUTPATIENT)
Dept: PRIMARY CARE | Facility: CLINIC | Age: 64
End: 2024-03-04

## 2024-03-04 VITALS
BODY MASS INDEX: 31.24 KG/M2 | DIASTOLIC BLOOD PRESSURE: 70 MMHG | HEIGHT: 64 IN | WEIGHT: 183 LBS | TEMPERATURE: 97.8 F | OXYGEN SATURATION: 98 % | HEART RATE: 70 BPM | SYSTOLIC BLOOD PRESSURE: 118 MMHG

## 2024-03-04 DIAGNOSIS — I10 HYPERTENSION, UNSPECIFIED TYPE: ICD-10-CM

## 2024-03-04 DIAGNOSIS — E78.2 MIXED HYPERLIPIDEMIA: ICD-10-CM

## 2024-03-04 DIAGNOSIS — Z00.00 ANNUAL PHYSICAL EXAM: Primary | ICD-10-CM

## 2024-03-04 DIAGNOSIS — Z12.31 ENCOUNTER FOR SCREENING MAMMOGRAM FOR BREAST CANCER: ICD-10-CM

## 2024-03-04 DIAGNOSIS — Z12.4 SCREENING FOR CERVICAL CANCER: ICD-10-CM

## 2024-03-04 DIAGNOSIS — E78.5 HYPERLIPIDEMIA, UNSPECIFIED HYPERLIPIDEMIA TYPE: ICD-10-CM

## 2024-03-04 PROCEDURE — 3074F SYST BP LT 130 MM HG: CPT

## 2024-03-04 PROCEDURE — 99214 OFFICE O/P EST MOD 30 MIN: CPT

## 2024-03-04 PROCEDURE — 3078F DIAST BP <80 MM HG: CPT

## 2024-03-04 PROCEDURE — 99396 PREV VISIT EST AGE 40-64: CPT

## 2024-03-04 PROCEDURE — 1036F TOBACCO NON-USER: CPT

## 2024-03-04 RX ORDER — ROSUVASTATIN CALCIUM 10 MG/1
10 TABLET, COATED ORAL DAILY
Qty: 90 TABLET | Refills: 0 | Status: SHIPPED | OUTPATIENT
Start: 2024-03-04 | End: 2024-05-16

## 2024-03-04 ASSESSMENT — PAIN SCALES - GENERAL: PAINLEVEL: 0-NO PAIN

## 2024-03-04 NOTE — PROGRESS NOTES
Subjective   Patient ID: Nell Goff is a 63 y.o. female who presents for Annual Exam.    HPI     Nell Goff presents for annual physical exam. No new concerns at this visit.  No recent hospitalizations or illness.     Patient's recent visit notes, medication and allergy lists, past medical surgical social hx, immunization, vitals, problem list, recent tests were reviewed by me for pertinence to this visit.      PMH:   -HTN- losartan 50 mg daily, BP today 118/70  -HLD - rosuvastatin 10 mg- started taking every other day 1 week ago because she would like to discontinue medication altogether.  Lipid levels well controlled at this visit. /HDL 48.6/LDL 49/  -GERD - not currently taking any medications - under Dr. Disla, last visit 9/2023  -IFG- A1C 5.9%,       Social Hx:  , 1 adult child  Not currently working, job interview today  Smoking: No- Former   Alcohol: No  Recreational drug use: No      Screening tools:  EKG: Yes - followed with Dr. Meléndez, last visit 1+ year ago    Colonoscopy: Yes - 2015, due 2025- Follows with Dr. Disla for GERD    Mammogram: last 1/10/2024- normal    PAP: Was scheduled with GYN but had to cancel appointment.  Will reschedule      Vaccinations:  Tdap: 2022 at   Flu Vaccine: will wait until fall  Shingles: will consider for future        Review of Systems   Constitutional:  Negative for activity change, appetite change, chills, fatigue, fever and unexpected weight change.   HENT:  Negative for dental problem, ear pain, hearing loss, mouth sores, nosebleeds, sinus pressure, sinus pain, sore throat and trouble swallowing.    Eyes:  Negative for photophobia, pain and visual disturbance.   Respiratory:  Negative for cough, chest tightness, shortness of breath and wheezing.    Cardiovascular:  Negative for chest pain, palpitations and leg swelling.   Gastrointestinal:  Negative for abdominal pain, blood in stool, constipation, diarrhea, nausea  "and vomiting.   Endocrine: Negative for cold intolerance, heat intolerance, polydipsia, polyphagia and polyuria.   Genitourinary:  Negative for decreased urine volume, dysuria, frequency and menstrual problem.   Musculoskeletal:  Negative for arthralgias, gait problem, joint swelling and myalgias.   Skin:  Negative for color change, rash and wound.   Allergic/Immunologic: Negative for environmental allergies and food allergies.   Neurological:  Negative for dizziness, seizures, syncope, speech difficulty, weakness, numbness and headaches.   Hematological:  Negative for adenopathy. Does not bruise/bleed easily.   Psychiatric/Behavioral:  Negative for behavioral problems, decreased concentration, self-injury, sleep disturbance and suicidal ideas. The patient is not nervous/anxious and is not hyperactive.        Objective   /70 (BP Location: Left arm)   Pulse 70   Temp 36.6 °C (97.8 °F) (Temporal)   Ht 1.626 m (5' 4\")   Wt 83 kg (183 lb)   SpO2 98%   BMI 31.41 kg/m²     Physical Exam  Vitals and nursing note reviewed.   Constitutional:       General: She is not in acute distress.     Appearance: Normal appearance. She is well-developed and well-groomed.   HENT:      Head: Normocephalic.      Jaw: There is normal jaw occlusion.      Right Ear: Hearing, tympanic membrane, ear canal and external ear normal.      Left Ear: Hearing, tympanic membrane, ear canal and external ear normal.      Nose: Nose normal.      Mouth/Throat:      Lips: Pink.      Mouth: Mucous membranes are moist.      Pharynx: Oropharynx is clear. Uvula midline.   Eyes:      General: Lids are normal. Vision grossly intact. Gaze aligned appropriately.      Extraocular Movements: Extraocular movements intact.      Conjunctiva/sclera: Conjunctivae normal.      Pupils: Pupils are equal, round, and reactive to light.   Neck:      Thyroid: No thyromegaly.      Vascular: No carotid bruit or JVD.      Trachea: Trachea and phonation normal. "   Cardiovascular:      Rate and Rhythm: Normal rate and regular rhythm.      Pulses: Normal pulses.      Heart sounds: Normal heart sounds, S1 normal and S2 normal.   Pulmonary:      Effort: Pulmonary effort is normal.      Breath sounds: Normal breath sounds and air entry.   Abdominal:      General: Bowel sounds are normal. There is no distension.      Palpations: Abdomen is soft. There is no hepatomegaly, splenomegaly or mass.      Tenderness: There is no abdominal tenderness. There is no right CVA tenderness, left CVA tenderness or guarding.   Musculoskeletal:         General: Normal range of motion.      Cervical back: Normal, full passive range of motion without pain, normal range of motion and neck supple.      Thoracic back: Normal.      Lumbar back: Normal.      Right lower leg: No edema.      Left lower leg: No edema.   Lymphadenopathy:      Head:      Right side of head: No submental, submandibular, tonsillar, preauricular, posterior auricular or occipital adenopathy.      Left side of head: No submental, submandibular, tonsillar, preauricular, posterior auricular or occipital adenopathy.      Cervical: No cervical adenopathy.      Right cervical: No superficial or posterior cervical adenopathy.     Left cervical: No superficial or posterior cervical adenopathy.      Upper Body:      Right upper body: No supraclavicular adenopathy.      Left upper body: No supraclavicular adenopathy.   Skin:     General: Skin is warm and dry.      Capillary Refill: Capillary refill takes less than 2 seconds.   Neurological:      General: No focal deficit present.      Mental Status: She is alert and oriented to person, place, and time.      Cranial Nerves: Cranial nerves 2-12 are intact.      Sensory: Sensation is intact.      Motor: Motor function is intact.      Coordination: Coordination is intact.      Gait: Gait is intact.   Psychiatric:         Attention and Perception: Attention and perception normal.         Mood  and Affect: Mood and affect normal.         Speech: Speech normal.         Behavior: Behavior normal. Behavior is cooperative.         Thought Content: Thought content normal.         Cognition and Memory: Cognition normal.         Judgment: Judgment normal.         Assessment/Plan   Problem List Items Addressed This Visit             ICD-10-CM    Hyperlipidemia  Chronic condition, stable with medication  Continue rosuvastatin daily  Labs reviewed  Reports any new onset of muscle pain or weakness.  Continue with health diet low in saturated fats, cholesterol, sodium, and limit sugars.  Exercise 30-45 minutes 5 days per week.  Follow up in 6-12 months.    E78.5    Relevant Medications    rosuvastatin (Crestor) 10 mg tablet     Other Visit Diagnoses         Codes    Annual physical exam    -  Primary  Well adult exam.  1. Age appropriate preventative measures reviewed.   2. Encouraged healthy diet and exercise.  3. Immunizations- Reviewed  4. Labs-reviewed with patient  5. Medications- Reviewed    *Follow-up in 1 year for repeat annual physical exam. Patient verbalizes understanding  regarding plan of care and all questions answered.   Z00.00    Encounter for screening mammogram for breast cancer     Z12.31    Screening for cervical cancer     Z12.4    Relevant Orders    Referral to Gynecology     Hypertension  Chronic condition, stable at this visit  Continue losartan 50 mg daily as prescribed.  Monitor home blood pressures.  Call if blood pressure consistently >140/90.  Non pharmacological interventions such as lowering salt, saturated fats, cholesterol, and sugar diet discussed.  Increase physical activity, aim for 30 minutes 5 days per week as able.  Stress reduction interventions discussed.  Discussed signs and symptoms of major cardiovascular event and need to present to the ED.   Reevaluate in 6 months.

## 2024-03-10 ASSESSMENT — ENCOUNTER SYMPTOMS
CHILLS: 0
APPETITE CHANGE: 0
BLOOD IN STOOL: 0
VOMITING: 0
SINUS PAIN: 0
SLEEP DISTURBANCE: 0
TROUBLE SWALLOWING: 0
WOUND: 0
ACTIVITY CHANGE: 0
POLYDIPSIA: 0
PHOTOPHOBIA: 0
DIZZINESS: 0
WHEEZING: 0
SPEECH DIFFICULTY: 0
CONSTIPATION: 0
ADENOPATHY: 0
FATIGUE: 0
BRUISES/BLEEDS EASILY: 0
FEVER: 0
FREQUENCY: 0
CHEST TIGHTNESS: 0
COLOR CHANGE: 0
SEIZURES: 0
EYE PAIN: 0
WEAKNESS: 0
MYALGIAS: 0
DYSURIA: 0
NERVOUS/ANXIOUS: 0
SHORTNESS OF BREATH: 0
PALPITATIONS: 0
DECREASED CONCENTRATION: 0
HEADACHES: 0
UNEXPECTED WEIGHT CHANGE: 0
COUGH: 0
SINUS PRESSURE: 0
NUMBNESS: 0
DIARRHEA: 0
SORE THROAT: 0
JOINT SWELLING: 0
NAUSEA: 0
ABDOMINAL PAIN: 0
POLYPHAGIA: 0
HYPERACTIVE: 0
ARTHRALGIAS: 0

## 2024-03-10 ASSESSMENT — VISUAL ACUITY: OU: 1

## 2024-03-25 ENCOUNTER — HOSPITAL ENCOUNTER (OUTPATIENT)
Dept: RADIOLOGY | Facility: HOSPITAL | Age: 64
Discharge: HOME | End: 2024-03-25
Payer: COMMERCIAL

## 2024-03-25 DIAGNOSIS — R10.11 RIGHT UPPER QUADRANT PAIN: ICD-10-CM

## 2024-03-25 PROCEDURE — A9537 TC99M MEBROFENIN: HCPCS | Performed by: INTERNAL MEDICINE

## 2024-03-25 PROCEDURE — 76705 ECHO EXAM OF ABDOMEN: CPT

## 2024-03-25 PROCEDURE — 78227 HEPATOBIL SYST IMAGE W/DRUG: CPT | Performed by: STUDENT IN AN ORGANIZED HEALTH CARE EDUCATION/TRAINING PROGRAM

## 2024-03-25 PROCEDURE — 76705 ECHO EXAM OF ABDOMEN: CPT | Performed by: STUDENT IN AN ORGANIZED HEALTH CARE EDUCATION/TRAINING PROGRAM

## 2024-03-25 PROCEDURE — 2500000004 HC RX 250 GENERAL PHARMACY W/ HCPCS (ALT 636 FOR OP/ED): Performed by: INTERNAL MEDICINE

## 2024-03-25 PROCEDURE — 3430000001 HC RX 343 DIAGNOSTIC RADIOPHARMACEUTICALS: Performed by: INTERNAL MEDICINE

## 2024-03-25 PROCEDURE — 78227 HEPATOBIL SYST IMAGE W/DRUG: CPT

## 2024-03-25 RX ORDER — SINCALIDE 5 UG/5ML
1.7 INJECTION, POWDER, LYOPHILIZED, FOR SOLUTION INTRAVENOUS ONCE
Status: COMPLETED | OUTPATIENT
Start: 2024-03-25 | End: 2024-03-25

## 2024-03-25 RX ORDER — KIT FOR THE PREPARATION OF TECHNETIUM TC 99M MEBROFENIN 45 MG/10ML
5.3 INJECTION, POWDER, LYOPHILIZED, FOR SOLUTION INTRAVENOUS
Status: COMPLETED | OUTPATIENT
Start: 2024-03-25 | End: 2024-03-25

## 2024-03-25 RX ORDER — SINCALIDE 5 UG/5ML
1.7 INJECTION, POWDER, LYOPHILIZED, FOR SOLUTION INTRAVENOUS ONCE
Status: CANCELLED | OUTPATIENT
Start: 2024-03-25 | End: 2024-03-25

## 2024-03-25 RX ADMIN — SINCALIDE 1.7 MCG: 5 INJECTION, POWDER, LYOPHILIZED, FOR SOLUTION INTRAVENOUS at 14:30

## 2024-03-25 RX ADMIN — KIT FOR THE PREPARATION OF TECHNETIUM TC 99M MEBROFENIN 5.3 MILLICURIE: 45 INJECTION, POWDER, LYOPHILIZED, FOR SOLUTION INTRAVENOUS at 13:15

## 2024-05-07 ENCOUNTER — OFFICE VISIT (OUTPATIENT)
Dept: PRIMARY CARE | Facility: CLINIC | Age: 64
End: 2024-05-07
Payer: COMMERCIAL

## 2024-05-07 VITALS
WEIGHT: 190 LBS | DIASTOLIC BLOOD PRESSURE: 74 MMHG | HEART RATE: 83 BPM | TEMPERATURE: 98.2 F | SYSTOLIC BLOOD PRESSURE: 130 MMHG | OXYGEN SATURATION: 96 % | BODY MASS INDEX: 32.61 KG/M2

## 2024-05-07 DIAGNOSIS — R10.33 UMBILICAL PAIN: ICD-10-CM

## 2024-05-07 DIAGNOSIS — R10.33 PERIUMBILICAL ABDOMINAL PAIN: Primary | ICD-10-CM

## 2024-05-07 PROCEDURE — 99213 OFFICE O/P EST LOW 20 MIN: CPT

## 2024-05-07 RX ORDER — OMEPRAZOLE 40 MG/1
40 CAPSULE, DELAYED RELEASE ORAL 2 TIMES DAILY
COMMUNITY
Start: 2024-04-30

## 2024-05-07 ASSESSMENT — PAIN SCALES - GENERAL: PAINLEVEL: 3

## 2024-05-07 ASSESSMENT — PATIENT HEALTH QUESTIONNAIRE - PHQ9
SUM OF ALL RESPONSES TO PHQ9 QUESTIONS 1 AND 2: 0
1. LITTLE INTEREST OR PLEASURE IN DOING THINGS: NOT AT ALL
2. FEELING DOWN, DEPRESSED OR HOPELESS: NOT AT ALL

## 2024-05-07 NOTE — PROGRESS NOTES
Subjective   Patient ID: Nell Goff is a 63 y.o. female who presents for Abdominal Pain and Mass.    HPI   Nell presents for complaints of pain around her belly button.  She thinks she was once told that she has a umbilical hernia.  She reports the pain started 2-3 days ago, worse yesterday.  Pain is just right of her belly button, rates 3/10 now- 6/10 yesterday.  Describes as a cramping type pain that comes and goes, worse if she is straining to cough.  Denies any urinary symptoms.  Denies constipation, Last BM today- normal for her.  Denies any nausea or vomiting.  No recent illnesses.  Has not taken any mediations.     Review of Systems  All other systems have been reviewed and are negative except as noted in the HPI.       Objective   /74 (BP Location: Left arm)   Pulse 83   Temp 36.8 °C (98.2 °F) (Temporal)   Wt 86.2 kg (190 lb)   SpO2 96%   BMI 32.61 kg/m²     Physical Exam  Vitals and nursing note reviewed.   Constitutional:       General: She is not in acute distress.     Appearance: Normal appearance.   Cardiovascular:      Rate and Rhythm: Normal rate and regular rhythm.      Pulses: Normal pulses.      Heart sounds: Normal heart sounds.   Pulmonary:      Effort: Pulmonary effort is normal.      Breath sounds: Normal breath sounds.   Abdominal:      General: Bowel sounds are normal. There is no distension.      Palpations: Abdomen is soft. There is no hepatomegaly, splenomegaly, mass or pulsatile mass.      Tenderness: There is abdominal tenderness in the periumbilical area. There is no guarding or rebound. Negative signs include Kruger's sign and Rovsing's sign.      Hernia: No hernia is present.   Neurological:      Mental Status: She is alert.           Assessment/Plan     Problem List Items Addressed This Visit    None  Visit Diagnoses         Codes    Periumbilical abdominal pain    -  Primary  Acute  Unknown etiology, no red flags on assessment  Obtain labs and ultrasound as  discussed.  Will follow up with results.   R10.33    Relevant Orders    CBC and Auto Differential    Comprehensive metabolic panel    Amylase    Lipase    US abdomen    Umbilical pain     R10.33

## 2024-05-16 ENCOUNTER — TELEPHONE (OUTPATIENT)
Dept: PRIMARY CARE | Facility: CLINIC | Age: 64
End: 2024-05-16
Payer: COMMERCIAL

## 2024-05-16 DIAGNOSIS — E78.5 HYPERLIPIDEMIA, UNSPECIFIED HYPERLIPIDEMIA TYPE: ICD-10-CM

## 2024-05-16 RX ORDER — ROSUVASTATIN CALCIUM 5 MG/1
5 TABLET, COATED ORAL DAILY
Qty: 90 TABLET | Refills: 0 | Status: SHIPPED | OUTPATIENT
Start: 2024-05-16

## 2024-05-16 NOTE — TELEPHONE ENCOUNTER
Patient called Rx line and requested a refill for Rosuvastatin - she wants the dose changed from 10 mg to 5 mg.  Pharmacy: [facility] [address]  Patient phone #: [phone]

## 2024-05-26 DIAGNOSIS — I10 HYPERTENSION, UNSPECIFIED TYPE: ICD-10-CM

## 2024-05-29 RX ORDER — LOSARTAN POTASSIUM 50 MG/1
50 TABLET ORAL DAILY
Qty: 90 TABLET | Refills: 0 | Status: SHIPPED | OUTPATIENT
Start: 2024-05-29 | End: 2024-05-30 | Stop reason: SDUPTHER

## 2024-05-30 ENCOUNTER — TELEPHONE (OUTPATIENT)
Dept: PRIMARY CARE | Facility: CLINIC | Age: 64
End: 2024-05-30
Payer: COMMERCIAL

## 2024-05-30 DIAGNOSIS — I10 HYPERTENSION, UNSPECIFIED TYPE: ICD-10-CM

## 2024-05-30 RX ORDER — LOSARTAN POTASSIUM 50 MG/1
50 TABLET ORAL DAILY
Qty: 90 TABLET | Refills: 0 | Status: SHIPPED | OUTPATIENT
Start: 2024-05-30

## 2024-06-05 ENCOUNTER — OFFICE VISIT (OUTPATIENT)
Dept: PRIMARY CARE | Facility: CLINIC | Age: 64
End: 2024-06-05
Payer: COMMERCIAL

## 2024-06-05 VITALS
OXYGEN SATURATION: 97 % | WEIGHT: 186 LBS | HEART RATE: 82 BPM | SYSTOLIC BLOOD PRESSURE: 112 MMHG | DIASTOLIC BLOOD PRESSURE: 76 MMHG | BODY MASS INDEX: 31.93 KG/M2 | RESPIRATION RATE: 16 BRPM | TEMPERATURE: 98.1 F

## 2024-06-05 DIAGNOSIS — G89.29 CHRONIC RIGHT SHOULDER PAIN: Primary | ICD-10-CM

## 2024-06-05 DIAGNOSIS — M25.511 CHRONIC RIGHT SHOULDER PAIN: Primary | ICD-10-CM

## 2024-06-05 PROCEDURE — 99213 OFFICE O/P EST LOW 20 MIN: CPT | Performed by: STUDENT IN AN ORGANIZED HEALTH CARE EDUCATION/TRAINING PROGRAM

## 2024-06-05 ASSESSMENT — ENCOUNTER SYMPTOMS
DEPRESSION: 0
OCCASIONAL FEELINGS OF UNSTEADINESS: 0
LOSS OF SENSATION IN FEET: 0

## 2024-06-05 ASSESSMENT — PAIN SCALES - GENERAL: PAINLEVEL: 2

## 2024-06-05 NOTE — PROGRESS NOTES
GENERAL PROGRESS NOTE    Chief Complaint   Patient presents with    Shoulder Pain     Patient is here for right shoulder pain on and off x years       HPI  Nell Goff is a 63 y.o. female  that presents today with complaint of Rt shoulder pain. Pain has been present for years, had previously discussed with prior PCP. Was told probably bursitis and did PT and got a bit better. This was several years ago, about 4-5 yrs. Pain location posterior shoulder and radiates around to the front in bicep area. Pain comes and goes, sometimes out of nowhere. Stretching out makes it feel better. Has taken tylenol arthritis and not too much help. Occas ice and does help some.     Vital signs   /76 (BP Location: Left arm, Patient Position: Sitting, BP Cuff Size: Large adult)   Pulse 82   Temp 36.7 °C (98.1 °F)   Resp 16   Wt 84.4 kg (186 lb)   SpO2 97%   BMI 31.93 kg/m²      Current Outpatient Medications   Medication Sig Dispense Refill    losartan (Cozaar) 50 mg tablet Take 1 tablet (50 mg) by mouth once daily. 90 tablet 0    omeprazole (PriLOSEC) 40 mg DR capsule Take 1 capsule (40 mg) by mouth 2 times a day.      rosuvastatin (Crestor) 5 mg tablet Take 1 tablet (5 mg) by mouth once daily. 90 tablet 0     No current facility-administered medications for this visit.       Review of Systems     Physical Exam  Musculoskeletal:      Right shoulder: Tenderness (mild tenderness on palpation anterior shoulder area of  AC joint and posterior along medial and inferior margins of the scapula) present. No swelling or deformity. Normal range of motion. Normal strength.      Left shoulder: Normal.      Comments: Full strength bilaterally on internal and external rotation. Empty can test elicited slight discomfort on right         Assessment/Plan   1. Chronic right shoulder pain  Needs better control  Discussed OTC  NSAID as needed at appropriate dosages   Given referral to PT.   Follow up after PT if no improvement or worsening.   - Referral to Physical Therapy; Future      Mariah Bates MD  06/05/24  3:55 PM

## 2024-06-17 ENCOUNTER — APPOINTMENT (OUTPATIENT)
Dept: PRIMARY CARE | Facility: CLINIC | Age: 64
End: 2024-06-17
Payer: COMMERCIAL

## 2024-06-27 ENCOUNTER — APPOINTMENT (OUTPATIENT)
Dept: PHYSICAL THERAPY | Facility: CLINIC | Age: 64
End: 2024-06-27
Payer: COMMERCIAL

## 2024-08-26 ENCOUNTER — TELEPHONE (OUTPATIENT)
Dept: PRIMARY CARE | Facility: CLINIC | Age: 64
End: 2024-08-26
Payer: COMMERCIAL

## 2024-08-26 DIAGNOSIS — I10 HYPERTENSION, UNSPECIFIED TYPE: ICD-10-CM

## 2024-08-26 NOTE — TELEPHONE ENCOUNTER
Patient wants a refill on Losartan 50 mg.  Pharmacy is [facility] in [place].     Patient's number:  [phone]

## 2024-08-27 RX ORDER — LOSARTAN POTASSIUM 50 MG/1
50 TABLET ORAL DAILY
Qty: 90 TABLET | Refills: 0 | Status: SHIPPED | OUTPATIENT
Start: 2024-08-27

## 2024-08-28 ENCOUNTER — TELEPHONE (OUTPATIENT)
Dept: PRIMARY CARE | Facility: CLINIC | Age: 64
End: 2024-08-28
Payer: COMMERCIAL

## 2024-08-28 DIAGNOSIS — I10 HYPERTENSION, UNSPECIFIED TYPE: ICD-10-CM

## 2024-08-28 RX ORDER — LOSARTAN POTASSIUM 50 MG/1
50 TABLET ORAL DAILY
Qty: 90 TABLET | Refills: 0 | OUTPATIENT
Start: 2024-08-28

## 2024-08-28 NOTE — TELEPHONE ENCOUNTER
Patient wants a refill on Losartan 50 mg.  Pharmacy is [facility] in [place].    Patient's number:  [phone]

## 2024-09-04 ENCOUNTER — APPOINTMENT (OUTPATIENT)
Dept: PRIMARY CARE | Facility: CLINIC | Age: 64
End: 2024-09-04
Payer: COMMERCIAL

## 2024-09-09 ENCOUNTER — LAB (OUTPATIENT)
Dept: LAB | Facility: LAB | Age: 64
End: 2024-09-09
Payer: COMMERCIAL

## 2024-09-09 DIAGNOSIS — R10.33 PERIUMBILICAL ABDOMINAL PAIN: ICD-10-CM

## 2024-09-09 DIAGNOSIS — I10 HYPERTENSION, UNSPECIFIED TYPE: ICD-10-CM

## 2024-09-09 DIAGNOSIS — E53.9 VITAMIN B DEFICIENCY: ICD-10-CM

## 2024-09-09 DIAGNOSIS — E78.5 HYPERLIPIDEMIA, UNSPECIFIED HYPERLIPIDEMIA TYPE: ICD-10-CM

## 2024-09-09 DIAGNOSIS — E55.9 VITAMIN D DEFICIENCY: ICD-10-CM

## 2024-09-09 LAB
ALBUMIN SERPL BCP-MCNC: 3.9 G/DL (ref 3.4–5)
ALP SERPL-CCNC: 63 U/L (ref 33–136)
ALT SERPL W P-5'-P-CCNC: <3 U/L (ref 7–45)
AMYLASE SERPL-CCNC: 38 U/L (ref 29–103)
ANION GAP SERPL CALC-SCNC: 13 MMOL/L (ref 10–20)
APPEARANCE UR: CLEAR
AST SERPL W P-5'-P-CCNC: 17 U/L (ref 9–39)
BILIRUB SERPL-MCNC: 0.7 MG/DL (ref 0–1.2)
BILIRUB UR STRIP.AUTO-MCNC: NEGATIVE MG/DL
BUN SERPL-MCNC: 17 MG/DL (ref 6–23)
CALCIUM SERPL-MCNC: 9 MG/DL (ref 8.6–10.3)
CHLORIDE SERPL-SCNC: 101 MMOL/L (ref 98–107)
CO2 SERPL-SCNC: 29 MMOL/L (ref 21–32)
COLOR UR: YELLOW
CREAT SERPL-MCNC: 0.83 MG/DL (ref 0.5–1.05)
EGFRCR SERPLBLD CKD-EPI 2021: 79 ML/MIN/1.73M*2
GLUCOSE SERPL-MCNC: 100 MG/DL (ref 74–99)
GLUCOSE UR STRIP.AUTO-MCNC: NORMAL MG/DL
KETONES UR STRIP.AUTO-MCNC: NEGATIVE MG/DL
LEUKOCYTE ESTERASE UR QL STRIP.AUTO: ABNORMAL
LIPASE SERPL-CCNC: 64 U/L (ref 9–82)
MUCOUS THREADS #/AREA URNS AUTO: NORMAL /LPF
NITRITE UR QL STRIP.AUTO: NEGATIVE
PH UR STRIP.AUTO: 6.5 [PH]
POTASSIUM SERPL-SCNC: 4.9 MMOL/L (ref 3.5–5.3)
PROT SERPL-MCNC: 6.2 G/DL (ref 6.4–8.2)
PROT UR STRIP.AUTO-MCNC: NEGATIVE MG/DL
RBC # UR STRIP.AUTO: NEGATIVE /UL
RBC #/AREA URNS AUTO: NORMAL /HPF
SODIUM SERPL-SCNC: 138 MMOL/L (ref 136–145)
SP GR UR STRIP.AUTO: 1.02
SQUAMOUS #/AREA URNS AUTO: NORMAL /HPF
UROBILINOGEN UR STRIP.AUTO-MCNC: NORMAL MG/DL
WBC #/AREA URNS AUTO: NORMAL /HPF

## 2024-09-09 PROCEDURE — 36415 COLL VENOUS BLD VENIPUNCTURE: CPT

## 2024-09-09 PROCEDURE — 82607 VITAMIN B-12: CPT

## 2024-09-09 PROCEDURE — 80053 COMPREHEN METABOLIC PANEL: CPT

## 2024-09-09 PROCEDURE — 82043 UR ALBUMIN QUANTITATIVE: CPT

## 2024-09-09 PROCEDURE — 83690 ASSAY OF LIPASE: CPT

## 2024-09-09 PROCEDURE — 82570 ASSAY OF URINE CREATININE: CPT

## 2024-09-09 PROCEDURE — 82150 ASSAY OF AMYLASE: CPT

## 2024-09-09 PROCEDURE — 81001 URINALYSIS AUTO W/SCOPE: CPT

## 2024-09-09 PROCEDURE — 82306 VITAMIN D 25 HYDROXY: CPT

## 2024-09-09 PROCEDURE — 80061 LIPID PANEL: CPT

## 2024-09-10 ENCOUNTER — TELEPHONE (OUTPATIENT)
Dept: PRIMARY CARE | Facility: CLINIC | Age: 64
End: 2024-09-10

## 2024-09-10 ENCOUNTER — APPOINTMENT (OUTPATIENT)
Dept: PRIMARY CARE | Facility: CLINIC | Age: 64
End: 2024-09-10
Payer: COMMERCIAL

## 2024-09-10 VITALS
BODY MASS INDEX: 32.1 KG/M2 | DIASTOLIC BLOOD PRESSURE: 70 MMHG | OXYGEN SATURATION: 98 % | SYSTOLIC BLOOD PRESSURE: 114 MMHG | WEIGHT: 187 LBS | HEART RATE: 71 BPM | TEMPERATURE: 97.8 F

## 2024-09-10 DIAGNOSIS — E78.2 MIXED HYPERLIPIDEMIA: ICD-10-CM

## 2024-09-10 DIAGNOSIS — R73.01 IFG (IMPAIRED FASTING GLUCOSE): ICD-10-CM

## 2024-09-10 DIAGNOSIS — R07.81 RIB PAIN ON RIGHT SIDE: ICD-10-CM

## 2024-09-10 DIAGNOSIS — I10 HYPERTENSION, UNSPECIFIED TYPE: Primary | ICD-10-CM

## 2024-09-10 DIAGNOSIS — E78.5 HYPERLIPIDEMIA, UNSPECIFIED HYPERLIPIDEMIA TYPE: ICD-10-CM

## 2024-09-10 DIAGNOSIS — I10 HYPERTENSION, UNSPECIFIED TYPE: ICD-10-CM

## 2024-09-10 LAB
25(OH)D3 SERPL-MCNC: 38 NG/ML (ref 30–100)
CHOLEST SERPL-MCNC: 302 MG/DL (ref 0–199)
CHOLESTEROL/HDL RATIO: 4.6
CREAT UR-MCNC: 89.4 MG/DL (ref 20–320)
HDLC SERPL-MCNC: 66.2 MG/DL
LDLC SERPL CALC-MCNC: 201 MG/DL
MICROALBUMIN UR-MCNC: <7 MG/L
MICROALBUMIN/CREAT UR: NORMAL MG/G{CREAT}
NON HDL CHOLESTEROL: 236 MG/DL (ref 0–149)
TRIGL SERPL-MCNC: 175 MG/DL (ref 0–149)
VIT B12 SERPL-MCNC: 822 PG/ML (ref 211–911)
VLDL: 35 MG/DL (ref 0–40)

## 2024-09-10 PROCEDURE — 99214 OFFICE O/P EST MOD 30 MIN: CPT | Performed by: STUDENT IN AN ORGANIZED HEALTH CARE EDUCATION/TRAINING PROGRAM

## 2024-09-10 PROCEDURE — 3078F DIAST BP <80 MM HG: CPT | Performed by: STUDENT IN AN ORGANIZED HEALTH CARE EDUCATION/TRAINING PROGRAM

## 2024-09-10 PROCEDURE — 3074F SYST BP LT 130 MM HG: CPT | Performed by: STUDENT IN AN ORGANIZED HEALTH CARE EDUCATION/TRAINING PROGRAM

## 2024-09-10 RX ORDER — LOSARTAN POTASSIUM 50 MG/1
50 TABLET ORAL DAILY
Qty: 90 TABLET | Refills: 1 | Status: SHIPPED | OUTPATIENT
Start: 2024-09-10

## 2024-09-10 ASSESSMENT — ENCOUNTER SYMPTOMS
LOSS OF SENSATION IN FEET: 0
OCCASIONAL FEELINGS OF UNSTEADINESS: 0
DEPRESSION: 0

## 2024-09-10 ASSESSMENT — COLUMBIA-SUICIDE SEVERITY RATING SCALE - C-SSRS
6. HAVE YOU EVER DONE ANYTHING, STARTED TO DO ANYTHING, OR PREPARED TO DO ANYTHING TO END YOUR LIFE?: NO
1. IN THE PAST MONTH, HAVE YOU WISHED YOU WERE DEAD OR WISHED YOU COULD GO TO SLEEP AND NOT WAKE UP?: NO
2. HAVE YOU ACTUALLY HAD ANY THOUGHTS OF KILLING YOURSELF?: NO

## 2024-09-10 ASSESSMENT — PAIN SCALES - GENERAL: PAINLEVEL: 0-NO PAIN

## 2024-09-10 NOTE — PROGRESS NOTES
GENERAL PROGRESS NOTE    Chief Complaint   Patient presents with    Hypertension    Pre-DM F/U       HPI  Nell Goff is a 63 y.o. female that presents today for HTN and preDM follow up. She has no concerns or complaints.     #HTN   losartan 50 mg daily   BP today 114/70  Home readings - checking infrequently but when does check is normal.     #HLD   Rosuvastatin 10 mg - was taking every other day at last OV 3/2024 with plan to stop completely. Has stopped medication completley around May 2024. Concerned it will affect her memory. LDL now 201      Still having stomach issues. Pain RUQ and epigastric region. Has had EGD through GI as well as RUQ and HIDA scan.     Vital signs   /70   Pulse 71   Temp 36.6 °C (97.8 °F)   Wt 84.8 kg (187 lb)   SpO2 98%   BMI 32.10 kg/m²      Current Outpatient Medications   Medication Sig Dispense Refill    losartan (Cozaar) 50 mg tablet Take 1 tablet (50 mg) by mouth once daily. 90 tablet 0     No current facility-administered medications for this visit.       Review of Systems   Constitutional:  Negative for chills and fever.   Eyes:  Negative for visual disturbance.   Respiratory:  Negative for shortness of breath.    Cardiovascular:  Negative for chest pain, palpitations and leg swelling.   Gastrointestinal:  Abdominal pain: Rt side/RUQ.   Neurological:  Negative for dizziness and headaches.        Physical Exam  Vitals and nursing note reviewed.   Constitutional:       General: She is not in acute distress.     Appearance: She is not ill-appearing.   Cardiovascular:      Rate and Rhythm: Normal rate and regular rhythm.      Heart sounds: Normal heart sounds. No murmur heard.  Pulmonary:      Effort: Pulmonary effort is normal. No respiratory distress.      Breath sounds: Normal breath sounds. No wheezing, rhonchi or rales.   Abdominal:      General:  Abdomen is flat. Bowel sounds are normal.      Palpations: Abdomen is soft.      Tenderness: Tenderness: tenderness over lower rib cage on Rt.   Musculoskeletal:      Right lower leg: No edema.      Left lower leg: No edema.         Assessment/Plan   1. Hypertension, unspecified type  Well controlled on current meds, no change in management.   Labs reviewed and discussed. Stable renal function  Continue dietary efforts and physical activity  Repeat in 6mos  - losartan (Cozaar) 50 mg tablet; Take 1 tablet (50 mg) by mouth once daily.  Dispense: 90 tablet; Refill: 1    2. IFG (impaired fasting glucose)  Fasting glucose remains at 100, A1c ordered but not included in labs. Reviewed diet and exercise recommendations.     3. Hyperlipidemia, unspecified hyperlipidemia type  Discussed interval increase in LDL since stopping statin. Discussed cardiac risks and her concerns regarding statin side effects. Agreeable to restarting rosuvastatin. Recheck lipids 3 mos.   - Lipid panel; Future    4. Rib pain on right side  Discussed potential etiology of pain may be MSK specifically costochondritis. Discussed expectant course and management. Trial NSAIDs, will let me know if any improvement.       Mariah Bates MD  09/10/24  11:13 AM

## 2024-09-10 NOTE — PROGRESS NOTES
Problem: Diabetes  Goal: Glycemic balance achieved/maintained  Goal is to maintain blood sugar within range with no episodes of hypoglycemia  Outcome: Outcome Met, Continue evaluating goal progress toward completion  Patient has no signs of hypo/hyper glycemia this shift. Patient is on an insulin drip but is npo.     Problem: Alcohol Withdrawal Management  Goal: # No alcohol-related delirium or seizures  Outcome: Outcome Met, Continue evaluating goal progress toward completion  No signs of alcohol withdrawal. Patient resting comfortably at this time        Pt offered flu shot and declined for today.

## 2024-09-11 ENCOUNTER — TELEPHONE (OUTPATIENT)
Dept: PRIMARY CARE | Facility: CLINIC | Age: 64
End: 2024-09-11
Payer: COMMERCIAL

## 2024-09-11 DIAGNOSIS — E78.5 HYPERLIPIDEMIA, UNSPECIFIED HYPERLIPIDEMIA TYPE: ICD-10-CM

## 2024-09-11 RX ORDER — ROSUVASTATIN CALCIUM 5 MG/1
5 TABLET, COATED ORAL DAILY
Qty: 90 TABLET | Refills: 1 | Status: SHIPPED | OUTPATIENT
Start: 2024-09-11

## 2024-09-17 ASSESSMENT — ENCOUNTER SYMPTOMS
DIZZINESS: 0
FEVER: 0
CHILLS: 0
PALPITATIONS: 0
SHORTNESS OF BREATH: 0
HEADACHES: 0

## 2024-10-21 ENCOUNTER — OFFICE VISIT (OUTPATIENT)
Dept: PRIMARY CARE | Facility: CLINIC | Age: 64
End: 2024-10-21
Payer: COMMERCIAL

## 2024-10-21 VITALS
HEIGHT: 64 IN | BODY MASS INDEX: 31.07 KG/M2 | SYSTOLIC BLOOD PRESSURE: 126 MMHG | DIASTOLIC BLOOD PRESSURE: 80 MMHG | HEART RATE: 80 BPM | TEMPERATURE: 98.3 F | OXYGEN SATURATION: 97 % | WEIGHT: 182 LBS

## 2024-10-21 DIAGNOSIS — J06.9 VIRAL URI WITH COUGH: Primary | ICD-10-CM

## 2024-10-21 DIAGNOSIS — J02.9 SORE THROAT: ICD-10-CM

## 2024-10-21 DIAGNOSIS — R05.9 COUGH, UNSPECIFIED TYPE: ICD-10-CM

## 2024-10-21 PROBLEM — Z20.822 CONTACT WITH AND (SUSPECTED) EXPOSURE TO COVID-19: Status: RESOLVED | Noted: 2023-01-19 | Resolved: 2024-10-21

## 2024-10-21 PROBLEM — H10.9 CONJUNCTIVITIS: Status: RESOLVED | Noted: 2023-07-17 | Resolved: 2024-10-21

## 2024-10-21 PROBLEM — H93.8X9 EAR LUMP: Status: RESOLVED | Noted: 2023-08-30 | Resolved: 2024-10-21

## 2024-10-21 LAB
POC BINAX EXPIRATION: NORMAL
POC BINAX NOW COVID SERIAL NUMBER: NORMAL
POC RAPID STREP: NEGATIVE
POC SARS-COV-2 AG BINAX: NORMAL

## 2024-10-21 PROCEDURE — 87811 SARS-COV-2 COVID19 W/OPTIC: CPT | Performed by: STUDENT IN AN ORGANIZED HEALTH CARE EDUCATION/TRAINING PROGRAM

## 2024-10-21 PROCEDURE — 3008F BODY MASS INDEX DOCD: CPT | Performed by: STUDENT IN AN ORGANIZED HEALTH CARE EDUCATION/TRAINING PROGRAM

## 2024-10-21 PROCEDURE — 87880 STREP A ASSAY W/OPTIC: CPT | Performed by: STUDENT IN AN ORGANIZED HEALTH CARE EDUCATION/TRAINING PROGRAM

## 2024-10-21 PROCEDURE — 99213 OFFICE O/P EST LOW 20 MIN: CPT | Performed by: STUDENT IN AN ORGANIZED HEALTH CARE EDUCATION/TRAINING PROGRAM

## 2024-10-21 ASSESSMENT — PATIENT HEALTH QUESTIONNAIRE - PHQ9
2. FEELING DOWN, DEPRESSED OR HOPELESS: NOT AT ALL
1. LITTLE INTEREST OR PLEASURE IN DOING THINGS: NOT AT ALL
SUM OF ALL RESPONSES TO PHQ9 QUESTIONS 1 AND 2: 0

## 2024-10-21 ASSESSMENT — COLUMBIA-SUICIDE SEVERITY RATING SCALE - C-SSRS: 1. IN THE PAST MONTH, HAVE YOU WISHED YOU WERE DEAD OR WISHED YOU COULD GO TO SLEEP AND NOT WAKE UP?: NO

## 2024-10-21 ASSESSMENT — PAIN SCALES - GENERAL: PAINLEVEL_OUTOF10: 0-NO PAIN

## 2024-10-21 NOTE — PROGRESS NOTES
"                                                                                                                 GENERAL PROGRESS NOTE    Chief Complaint   Patient presents with    Sick Visit     Sore throat , body aches, runny nose .   - has not covid tested   - wants to be tested for strep .        HPI  Nell Goff is a 64 y.o. female that presents with three day history of sore throat, congestion, cough. Chills but no fevers. Has been using Nyquil HBP and helping a little. No SOB/PHAM, chest pain or pain with breathing.     Vital signs   /80   Pulse 80   Temp 36.8 °C (98.3 °F)   Ht 1.626 m (5' 4\")   Wt 82.6 kg (182 lb)   SpO2 97%   BMI 31.24 kg/m²      Current Outpatient Medications   Medication Sig Dispense Refill    losartan (Cozaar) 50 mg tablet Take 1 tablet (50 mg) by mouth once daily. 90 tablet 1    rosuvastatin (Crestor) 5 mg tablet Take 1 tablet (5 mg) by mouth once daily. 90 tablet 1     No current facility-administered medications for this visit.       Review of Systems   Constitutional:  Positive for chills. Negative for fever.   HENT:  Positive for congestion and sore throat. Negative for ear pain.    Respiratory:  Positive for cough. Negative for shortness of breath and wheezing.    Cardiovascular:  Negative for chest pain and palpitations.        Physical Exam  Vitals and nursing note reviewed.   Constitutional:       General: She is not in acute distress.     Appearance: She is not ill-appearing.   HENT:      Right Ear: Tympanic membrane, ear canal and external ear normal.      Left Ear: Tympanic membrane, ear canal and external ear normal.   Cardiovascular:      Rate and Rhythm: Normal rate and regular rhythm.      Heart sounds: Normal heart sounds. No murmur heard.  Pulmonary:      Effort: Pulmonary effort is normal. No respiratory distress.      Breath sounds: Normal breath sounds. No wheezing, rhonchi or rales.   Musculoskeletal:      Cervical back: Normal range of motion and " neck supple.      Right lower leg: No edema.      Left lower leg: No edema.         Assessment/Plan   Problem List Items Addressed This Visit       Sore throat    Relevant Orders    POCT Rapid Strep A manually resulted (Completed)    Cough    Relevant Orders    POCT BinaxNOW Covid-19 Ag Card manually resulted (Completed)    POCT Rapid Strep A manually resulted (Completed)     Other Visit Diagnoses       Viral URI with cough    -  Primary          Orders Placed This Encounter   Procedures    POCT BinaxNOW Covid-19 Ag Card manually resulted     Order Specific Question:   Release result to MyChart     Answer:   Immediate [1]    POCT Rapid Strep A manually resulted     Order Specific Question:   Release result to MyChart     Answer:   Immediate [1]      COVID and strep testing both negative.   Discussed likely viral. No indication for antibiotics at this time.   Over-the-counter symptomatic relief discussed.  Stay hydrated.  Follow-up in 1 week if symptoms persist      Mariah Bates MD  10/21/24  11:56 AM

## 2024-10-29 ENCOUNTER — OFFICE VISIT (OUTPATIENT)
Dept: PRIMARY CARE | Facility: CLINIC | Age: 64
End: 2024-10-29
Payer: COMMERCIAL

## 2024-10-29 ENCOUNTER — APPOINTMENT (OUTPATIENT)
Dept: PRIMARY CARE | Facility: CLINIC | Age: 64
End: 2024-10-29
Payer: COMMERCIAL

## 2024-10-29 VITALS
TEMPERATURE: 98.1 F | SYSTOLIC BLOOD PRESSURE: 126 MMHG | BODY MASS INDEX: 31.24 KG/M2 | HEART RATE: 88 BPM | DIASTOLIC BLOOD PRESSURE: 72 MMHG | WEIGHT: 182 LBS | OXYGEN SATURATION: 97 %

## 2024-10-29 DIAGNOSIS — J06.9 VIRAL URI WITH COUGH: Primary | ICD-10-CM

## 2024-10-29 PROCEDURE — 99213 OFFICE O/P EST LOW 20 MIN: CPT | Performed by: FAMILY MEDICINE

## 2024-10-29 PROCEDURE — 1036F TOBACCO NON-USER: CPT | Performed by: FAMILY MEDICINE

## 2024-10-29 ASSESSMENT — ENCOUNTER SYMPTOMS
PALPITATIONS: 0
SORE THROAT: 1
CHILLS: 1
COUGH: 1
SHORTNESS OF BREATH: 0
WHEEZING: 0
FEVER: 0

## 2024-10-29 ASSESSMENT — PAIN SCALES - GENERAL: PAINLEVEL_OUTOF10: 0-NO PAIN

## 2024-12-06 ENCOUNTER — TELEPHONE (OUTPATIENT)
Dept: PRIMARY CARE | Facility: CLINIC | Age: 64
End: 2024-12-06

## 2024-12-06 ENCOUNTER — OFFICE VISIT (OUTPATIENT)
Dept: PRIMARY CARE | Facility: CLINIC | Age: 64
End: 2024-12-06
Payer: COMMERCIAL

## 2024-12-06 VITALS
BODY MASS INDEX: 31.24 KG/M2 | SYSTOLIC BLOOD PRESSURE: 130 MMHG | WEIGHT: 182 LBS | HEART RATE: 70 BPM | OXYGEN SATURATION: 96 % | DIASTOLIC BLOOD PRESSURE: 80 MMHG

## 2024-12-06 DIAGNOSIS — M54.6 ACUTE THORACIC BACK PAIN, UNSPECIFIED BACK PAIN LATERALITY: Primary | ICD-10-CM

## 2024-12-06 DIAGNOSIS — M54.6 ACUTE THORACIC BACK PAIN, UNSPECIFIED BACK PAIN LATERALITY: ICD-10-CM

## 2024-12-06 DIAGNOSIS — M54.50 ACUTE LOW BACK PAIN, UNSPECIFIED BACK PAIN LATERALITY, UNSPECIFIED WHETHER SCIATICA PRESENT: ICD-10-CM

## 2024-12-06 LAB
POC APPEARANCE, URINE: CLEAR
POC BILIRUBIN, URINE: NEGATIVE
POC BLOOD, URINE: NEGATIVE
POC COLOR, URINE: YELLOW
POC GLUCOSE, URINE: NEGATIVE MG/DL
POC KETONES, URINE: NEGATIVE MG/DL
POC LEUKOCYTES, URINE: ABNORMAL
POC NITRITE,URINE: NEGATIVE
POC PH, URINE: 6 PH
POC PROTEIN, URINE: NEGATIVE MG/DL
POC SPECIFIC GRAVITY, URINE: 1.02
POC UROBILINOGEN, URINE: 0.2 EU/DL

## 2024-12-06 PROCEDURE — 1036F TOBACCO NON-USER: CPT | Performed by: FAMILY MEDICINE

## 2024-12-06 PROCEDURE — 99213 OFFICE O/P EST LOW 20 MIN: CPT | Performed by: FAMILY MEDICINE

## 2024-12-06 PROCEDURE — 81003 URINALYSIS AUTO W/O SCOPE: CPT | Performed by: FAMILY MEDICINE

## 2024-12-06 RX ORDER — NAPROXEN SODIUM 550 MG/1
550 TABLET ORAL
Qty: 20 TABLET | Refills: 1 | Status: SHIPPED | OUTPATIENT
Start: 2024-12-06 | End: 2024-12-26

## 2024-12-06 ASSESSMENT — PAIN SCALES - GENERAL: PAINLEVEL_OUTOF10: 3

## 2024-12-06 NOTE — TELEPHONE ENCOUNTER
[facility] / [facility] [phone] called re: Naproxen  550 mg needs to clarify directions, she saw Dr Badillo today

## 2024-12-06 NOTE — ASSESSMENT & PLAN NOTE
Orders:    naproxen sodium (Anaprox DS) 550 mg tablet; Take 1 tablet (550 mg) by mouth 2 times daily (morning and late afternoon) for 20 days.  She was reassured that there are no red flags.  She should take Anaprox twice daily with food.  She was reassured that this is okay to take with her losartan short-term.  She should apply heat to the area for 20 to 30 minutes 2-3 times daily.  I recommend she follow-up with Dr. Bates for recheck in a couple weeks.

## 2024-12-06 NOTE — PROGRESS NOTES
Subjective   Patient ID: Nell Goff is a 64 y.o. female who presents for Back Pain (Intermittently in the thoracic spine x 1 month. No injury).    HPI   Nell presents as an acute with pain in her thoracic back bilaterally which she has had on and off for the past year but it is now worse for the past 2 days.  There was no recent injury.  She has not taken anything for pain.  She was thinking about putting some ice on it.  The only thing that makes it feel better is for her to lay on her stomach on a pillow.  She states sometimes the pain wraps around her right side into her upper abdomen.  She has an appointment with a GI specialist but not till February.  She was seen by her primary in  several times for pain on her right side since March.  She was worked up for intra-abdominal pathology with an ultrasound and a HIDA scan which were negative.  It was suggested that this was musculoskeletal rib pain.    Review of Systems  She denies fever or chills.  She denies pain with breathing or shortness of breath.  No nausea or vomiting.  No change in appetite.  No change in bowel habits.  No numbness or tingling.  No urinary complaints.    Objective   /80 (BP Location: Left arm)   Pulse 70   Wt 82.6 kg (182 lb)   SpO2 96%   BMI 31.24 kg/m²     Physical Exam  She is alert and comfortable.  She is able to get on and off the table without difficulty.  Lungs are clear to auscultation.  No pain with breathing.  Back shows no tenderness along the spinous processes.  She has tenderness along multiple rib lines in the right thoracic area.  No specific point tenderness.  No palpable muscle spasm.  Abdomen is soft and nontender.  No rebound or guarding.  No tenderness.  Straight leg raise is negative.  Skin shows normal turgor and mucous membranes are moist.    Assessment/Plan   Assessment & Plan  Acute thoracic back pain, unspecified back pain laterality    Orders:    naproxen sodium (Anaprox DS) 550 mg tablet; Take  1 tablet (550 mg) by mouth 2 times daily (morning and late afternoon) for 20 days.  She was reassured that there are no red flags.  She should take Anaprox twice daily with food.  She was reassured that this is okay to take with her losartan short-term.  She should apply heat to the area for 20 to 30 minutes 2-3 times daily.  I recommend she follow-up with Dr. Bates for recheck in a couple weeks.  Acute low back pain, unspecified back pain laterality, unspecified whether sciatica present    Orders:    POCT UA Automated manually resulted

## 2024-12-06 NOTE — PATIENT INSTRUCTIONS
You may take Anaprox twice daily with food.  I recommend to apply heat to your back for 20 to 30 minutes at a time 3 times daily.  Follow-up with Dr. Bates for further evaluation if needed.

## 2024-12-09 ENCOUNTER — TELEPHONE (OUTPATIENT)
Dept: PRIMARY CARE | Facility: CLINIC | Age: 64
End: 2024-12-09
Payer: COMMERCIAL

## 2024-12-09 DIAGNOSIS — M54.6 ACUTE THORACIC BACK PAIN, UNSPECIFIED BACK PAIN LATERALITY: ICD-10-CM

## 2024-12-09 RX ORDER — NAPROXEN SODIUM 550 MG/1
550 TABLET ORAL
Qty: 20 TABLET | Refills: 0 | Status: SHIPPED | OUTPATIENT
Start: 2024-12-09 | End: 2024-12-19

## 2024-12-09 RX ORDER — NAPROXEN SODIUM 550 MG/1
550 TABLET ORAL
Qty: 20 TABLET | Refills: 1 | OUTPATIENT
Start: 2024-12-09 | End: 2024-12-29

## 2024-12-09 NOTE — TELEPHONE ENCOUNTER
[facility] pharmacy called again for clarification on the instructions for the naproxen 550 mg.    1- They wanted to check that patient can have 500 mg as they do not have the 550 mg.    2. Is medication supposed to be 2x a day for 10 days or 2x a day for 20 days, as only 20 tablets were sent.    Please advise: [phone]

## 2024-12-10 NOTE — TELEPHONE ENCOUNTER
Spoke with [facility] pharmacy. The directions were correct but it was sent for 550 mg so she is going to pull it back and change it to 500 mg since the patient has not picked it up yet.

## 2025-01-06 ENCOUNTER — APPOINTMENT (OUTPATIENT)
Dept: PRIMARY CARE | Facility: CLINIC | Age: 65
End: 2025-01-06
Payer: COMMERCIAL

## 2025-01-06 VITALS
BODY MASS INDEX: 31.24 KG/M2 | DIASTOLIC BLOOD PRESSURE: 80 MMHG | HEART RATE: 78 BPM | OXYGEN SATURATION: 95 % | TEMPERATURE: 98 F | WEIGHT: 182 LBS | SYSTOLIC BLOOD PRESSURE: 136 MMHG

## 2025-01-06 DIAGNOSIS — M54.6 RIGHT-SIDED THORACIC BACK PAIN, UNSPECIFIED CHRONICITY: Primary | ICD-10-CM

## 2025-01-06 PROCEDURE — 99213 OFFICE O/P EST LOW 20 MIN: CPT | Performed by: STUDENT IN AN ORGANIZED HEALTH CARE EDUCATION/TRAINING PROGRAM

## 2025-01-06 RX ORDER — NAPROXEN 500 MG/1
TABLET ORAL
COMMUNITY
Start: 2024-12-10

## 2025-01-06 ASSESSMENT — PAIN SCALES - GENERAL: PAINLEVEL_OUTOF10: 3

## 2025-01-06 NOTE — PROGRESS NOTES
GENERAL PROGRESS NOTE    Chief Complaint   Patient presents with    Follow-up     Back pain (pt saw EAK 12/6/24)  Pt states back pain has not gotten better, pt had pain in upper back but has now migrated down to lower back  No hx of possible injury       HPI  Nell Goff is a 64 y.o. female that presents today for back pain follow up.   Had COVID over [place]. Tested positive 12/20/2024. Feeling a lot better now but still having sinus drainage but that has improved. Cough has resolved.    Continues to have back pain in the thoracic region. Continues to feel around upper back and radiates to RUQ. This is the same pain she has had chronically and has been worked up extensively. Worsened for few days so seen in this office by Dr. Badillo who recommended short course naproxen and heat. Since having COVID has noted increase in lower back pain as well that she attributes to inactivity. Spent most days while sick lying in bed.     Vital signs   /80   Pulse 78   Temp 36.7 °C (98 °F)   Wt 82.6 kg (182 lb)   SpO2 95%   BMI 31.24 kg/m²      Current Outpatient Medications   Medication Sig Dispense Refill    losartan (Cozaar) 50 mg tablet Take 1 tablet (50 mg) by mouth once daily. 90 tablet 1    naproxen (Naprosyn) 500 mg tablet TAKE 1 TABLET BY MOUTH 2 TIMES A DAY IN THE MORNING AND AFTERNOON FOR 10 DAYS      rosuvastatin (Crestor) 5 mg tablet Take 1 tablet (5 mg) by mouth once daily. 90 tablet 1     No current facility-administered medications for this visit.       Review of Systems   Constitutional:  Negative for chills and fever.   HENT:  Positive for postnasal drip. Negative for congestion, ear pain, sinus pressure, sinus pain, sore throat and trouble swallowing.    Respiratory:  Negative for cough, chest tightness, shortness of breath and wheezing.    Cardiovascular:  Negative for chest pain.    Musculoskeletal:  Positive for back pain. Negative for neck pain.        Physical Exam  Constitutional:       Appearance: Normal appearance.   HENT:      Right Ear: Tympanic membrane, ear canal and external ear normal.      Left Ear: Tympanic membrane, ear canal and external ear normal.      Mouth/Throat:      Mouth: Mucous membranes are moist.      Pharynx: Oropharynx is clear. No oropharyngeal exudate or posterior oropharyngeal erythema.   Eyes:      Extraocular Movements: Extraocular movements intact.      Conjunctiva/sclera: Conjunctivae normal.   Cardiovascular:      Rate and Rhythm: Normal rate and regular rhythm.      Heart sounds: Normal heart sounds.   Pulmonary:      Effort: Pulmonary effort is normal.      Breath sounds: Normal breath sounds. No wheezing, rhonchi or rales.   Musculoskeletal:      Cervical back: Normal range of motion. No tenderness.      Thoracic back: No spasms, tenderness (mild tenderness along Rt thoracic paraspinal and posterior ribcage) or bony tenderness.      Lumbar back: No spasms, tenderness or bony tenderness. Negative right straight leg raise test and negative left straight leg raise test.   Neurological:      Mental Status: She is alert.         Assessment/Plan   1. Right-sided thoracic back pain, unspecified chronicity (Primary)  No red flags. Referral for physical therapy given chronicity of pain recently becoming more bothersome. She is agreeable to this. Continue heat as needed.   - XR thoracic spine complete 4+ views; Future  - Referral to Physical Therapy; Future      Mariah Bates MD  01/06/25  11:42 AM

## 2025-01-15 ASSESSMENT — ENCOUNTER SYMPTOMS
CHILLS: 0
FEVER: 0
BACK PAIN: 1
COUGH: 0
NECK PAIN: 0
SINUS PAIN: 0
SHORTNESS OF BREATH: 0
WHEEZING: 0
TROUBLE SWALLOWING: 0
SORE THROAT: 0
CHEST TIGHTNESS: 0
SINUS PRESSURE: 0

## 2025-01-20 ENCOUNTER — HOSPITAL ENCOUNTER (EMERGENCY)
Facility: HOSPITAL | Age: 65
Discharge: HOME | End: 2025-01-20
Payer: COMMERCIAL

## 2025-01-20 ENCOUNTER — APPOINTMENT (OUTPATIENT)
Dept: RADIOLOGY | Facility: HOSPITAL | Age: 65
End: 2025-01-20
Payer: COMMERCIAL

## 2025-01-20 ENCOUNTER — APPOINTMENT (OUTPATIENT)
Dept: CARDIOLOGY | Facility: HOSPITAL | Age: 65
End: 2025-01-20
Payer: COMMERCIAL

## 2025-01-20 VITALS
RESPIRATION RATE: 16 BRPM | SYSTOLIC BLOOD PRESSURE: 144 MMHG | OXYGEN SATURATION: 99 % | WEIGHT: 183.42 LBS | HEART RATE: 73 BPM | HEIGHT: 64 IN | BODY MASS INDEX: 31.31 KG/M2 | DIASTOLIC BLOOD PRESSURE: 69 MMHG | TEMPERATURE: 97.7 F

## 2025-01-20 DIAGNOSIS — R25.2 LEG CRAMP: ICD-10-CM

## 2025-01-20 DIAGNOSIS — N30.00 ACUTE CYSTITIS WITHOUT HEMATURIA: ICD-10-CM

## 2025-01-20 DIAGNOSIS — R07.9 ACUTE CHEST PAIN: Primary | ICD-10-CM

## 2025-01-20 LAB
ALBUMIN SERPL BCP-MCNC: 4.3 G/DL (ref 3.4–5)
ALP SERPL-CCNC: 62 U/L (ref 33–136)
ALT SERPL W P-5'-P-CCNC: <3 U/L (ref 7–45)
ANION GAP SERPL CALCULATED.3IONS-SCNC: 11 MMOL/L (ref 10–20)
APPEARANCE UR: CLEAR
AST SERPL W P-5'-P-CCNC: 21 U/L (ref 9–39)
BACTERIA #/AREA URNS AUTO: ABNORMAL /HPF
BASOPHILS # BLD AUTO: 0.07 X10*3/UL (ref 0–0.1)
BASOPHILS NFR BLD AUTO: 1 %
BILIRUB SERPL-MCNC: 0.5 MG/DL (ref 0–1.2)
BILIRUB UR STRIP.AUTO-MCNC: NEGATIVE MG/DL
BNP SERPL-MCNC: 39 PG/ML (ref 0–99)
BUN SERPL-MCNC: 11 MG/DL (ref 6–23)
CALCIUM SERPL-MCNC: 9.3 MG/DL (ref 8.6–10.3)
CARDIAC TROPONIN I PNL SERPL HS: 3 NG/L (ref 0–13)
CARDIAC TROPONIN I PNL SERPL HS: <3 NG/L (ref 0–13)
CHLORIDE SERPL-SCNC: 102 MMOL/L (ref 98–107)
CO2 SERPL-SCNC: 29 MMOL/L (ref 21–32)
COLOR UR: ABNORMAL
CREAT SERPL-MCNC: 0.84 MG/DL (ref 0.5–1.05)
D DIMER PPP FEU-MCNC: 0.35 MG/L FEU (ref 0.19–0.5)
EGFRCR SERPLBLD CKD-EPI 2021: 78 ML/MIN/1.73M*2
EOSINOPHIL # BLD AUTO: 0.28 X10*3/UL (ref 0–0.7)
EOSINOPHIL NFR BLD AUTO: 4 %
ERYTHROCYTE [DISTWIDTH] IN BLOOD BY AUTOMATED COUNT: 12.3 % (ref 11.5–14.5)
FLUAV RNA RESP QL NAA+PROBE: NOT DETECTED
FLUBV RNA RESP QL NAA+PROBE: NOT DETECTED
GLUCOSE SERPL-MCNC: 112 MG/DL (ref 74–99)
GLUCOSE UR STRIP.AUTO-MCNC: NORMAL MG/DL
HCT VFR BLD AUTO: 42.4 % (ref 36–46)
HGB BLD-MCNC: 13.9 G/DL (ref 12–16)
IMM GRANULOCYTES # BLD AUTO: 0.01 X10*3/UL (ref 0–0.7)
IMM GRANULOCYTES NFR BLD AUTO: 0.1 % (ref 0–0.9)
KETONES UR STRIP.AUTO-MCNC: NEGATIVE MG/DL
LEUKOCYTE ESTERASE UR QL STRIP.AUTO: ABNORMAL
LYMPHOCYTES # BLD AUTO: 3.2 X10*3/UL (ref 1.2–4.8)
LYMPHOCYTES NFR BLD AUTO: 45.5 %
MCH RBC QN AUTO: 30.2 PG (ref 26–34)
MCHC RBC AUTO-ENTMCNC: 32.8 G/DL (ref 32–36)
MCV RBC AUTO: 92 FL (ref 80–100)
MONOCYTES # BLD AUTO: 0.65 X10*3/UL (ref 0.1–1)
MONOCYTES NFR BLD AUTO: 9.2 %
NEUTROPHILS # BLD AUTO: 2.82 X10*3/UL (ref 1.2–7.7)
NEUTROPHILS NFR BLD AUTO: 40.2 %
NITRITE UR QL STRIP.AUTO: NEGATIVE
NRBC BLD-RTO: 0 /100 WBCS (ref 0–0)
PH UR STRIP.AUTO: 6.5 [PH]
PLATELET # BLD AUTO: 314 X10*3/UL (ref 150–450)
POTASSIUM SERPL-SCNC: 3.7 MMOL/L (ref 3.5–5.3)
PROT SERPL-MCNC: 7.1 G/DL (ref 6.4–8.2)
PROT UR STRIP.AUTO-MCNC: NEGATIVE MG/DL
RBC # BLD AUTO: 4.6 X10*6/UL (ref 4–5.2)
RBC # UR STRIP.AUTO: NEGATIVE /UL
RBC #/AREA URNS AUTO: ABNORMAL /HPF
SARS-COV-2 RNA RESP QL NAA+PROBE: NOT DETECTED
SODIUM SERPL-SCNC: 138 MMOL/L (ref 136–145)
SP GR UR STRIP.AUTO: 1.01
SQUAMOUS #/AREA URNS AUTO: ABNORMAL /HPF
UROBILINOGEN UR STRIP.AUTO-MCNC: NORMAL MG/DL
WBC # BLD AUTO: 7 X10*3/UL (ref 4.4–11.3)
WBC #/AREA URNS AUTO: ABNORMAL /HPF

## 2025-01-20 PROCEDURE — 85300 ANTITHROMBIN III ACTIVITY: CPT | Performed by: PHYSICIAN ASSISTANT

## 2025-01-20 PROCEDURE — 36415 COLL VENOUS BLD VENIPUNCTURE: CPT | Performed by: PHYSICIAN ASSISTANT

## 2025-01-20 PROCEDURE — 93005 ELECTROCARDIOGRAM TRACING: CPT

## 2025-01-20 PROCEDURE — 87086 URINE CULTURE/COLONY COUNT: CPT | Mod: TRILAB | Performed by: PHYSICIAN ASSISTANT

## 2025-01-20 PROCEDURE — 81001 URINALYSIS AUTO W/SCOPE: CPT | Performed by: PHYSICIAN ASSISTANT

## 2025-01-20 PROCEDURE — 93970 EXTREMITY STUDY: CPT | Performed by: STUDENT IN AN ORGANIZED HEALTH CARE EDUCATION/TRAINING PROGRAM

## 2025-01-20 PROCEDURE — 83880 ASSAY OF NATRIURETIC PEPTIDE: CPT | Performed by: PHYSICIAN ASSISTANT

## 2025-01-20 PROCEDURE — 87636 SARSCOV2 & INF A&B AMP PRB: CPT

## 2025-01-20 PROCEDURE — 80053 COMPREHEN METABOLIC PANEL: CPT | Performed by: PHYSICIAN ASSISTANT

## 2025-01-20 PROCEDURE — 84484 ASSAY OF TROPONIN QUANT: CPT | Performed by: PHYSICIAN ASSISTANT

## 2025-01-20 PROCEDURE — 2500000001 HC RX 250 WO HCPCS SELF ADMINISTERED DRUGS (ALT 637 FOR MEDICARE OP)

## 2025-01-20 PROCEDURE — 93970 EXTREMITY STUDY: CPT

## 2025-01-20 PROCEDURE — 71046 X-RAY EXAM CHEST 2 VIEWS: CPT | Mod: FOREIGN READ | Performed by: RADIOLOGY

## 2025-01-20 PROCEDURE — 99285 EMERGENCY DEPT VISIT HI MDM: CPT | Mod: 25

## 2025-01-20 PROCEDURE — 85025 COMPLETE CBC W/AUTO DIFF WBC: CPT | Performed by: PHYSICIAN ASSISTANT

## 2025-01-20 PROCEDURE — 71046 X-RAY EXAM CHEST 2 VIEWS: CPT

## 2025-01-20 RX ORDER — CEPHALEXIN 500 MG/1
500 CAPSULE ORAL 2 TIMES DAILY
Qty: 10 CAPSULE | Refills: 0 | Status: SHIPPED | OUTPATIENT
Start: 2025-01-20 | End: 2025-01-25

## 2025-01-20 RX ORDER — CEPHALEXIN 500 MG/1
500 CAPSULE ORAL ONCE
Status: COMPLETED | OUTPATIENT
Start: 2025-01-20 | End: 2025-01-20

## 2025-01-20 RX ADMIN — CEPHALEXIN 500 MG: 500 CAPSULE ORAL at 19:46

## 2025-01-20 ASSESSMENT — PAIN - FUNCTIONAL ASSESSMENT: PAIN_FUNCTIONAL_ASSESSMENT: 0-10

## 2025-01-20 ASSESSMENT — PAIN DESCRIPTION - LOCATION: LOCATION: CHEST

## 2025-01-20 ASSESSMENT — COLUMBIA-SUICIDE SEVERITY RATING SCALE - C-SSRS
6. HAVE YOU EVER DONE ANYTHING, STARTED TO DO ANYTHING, OR PREPARED TO DO ANYTHING TO END YOUR LIFE?: NO
2. HAVE YOU ACTUALLY HAD ANY THOUGHTS OF KILLING YOURSELF?: NO
1. IN THE PAST MONTH, HAVE YOU WISHED YOU WERE DEAD OR WISHED YOU COULD GO TO SLEEP AND NOT WAKE UP?: NO

## 2025-01-20 ASSESSMENT — PAIN DESCRIPTION - FREQUENCY: FREQUENCY: CONSTANT/CONTINUOUS

## 2025-01-20 ASSESSMENT — PAIN DESCRIPTION - ONSET: ONSET: SUDDEN

## 2025-01-20 ASSESSMENT — PAIN DESCRIPTION - PROGRESSION: CLINICAL_PROGRESSION: NOT CHANGED

## 2025-01-20 ASSESSMENT — PAIN DESCRIPTION - DESCRIPTORS
DESCRIPTORS: DULL
DESCRIPTORS: DULL

## 2025-01-20 ASSESSMENT — PAIN SCALES - GENERAL: PAINLEVEL_OUTOF10: 5 - MODERATE PAIN

## 2025-01-20 ASSESSMENT — PAIN DESCRIPTION - PAIN TYPE: TYPE: ACUTE PAIN

## 2025-01-20 ASSESSMENT — PAIN DESCRIPTION - ORIENTATION: ORIENTATION: LEFT;RIGHT;LOWER

## 2025-01-20 NOTE — ED PROVIDER NOTES
HPI   No chief complaint on file.      64-year-old female with history of hypertension and hyperlipidemia presented emergency department with a chief complaint of 1 hour of dull ache under her breast that radiates to her back that is worse on the right side.  Pain seems worse with a deep breath.  She also complains of bilateral lower extremity cramping over the last several days.  She denies hormone use, recent travel, recent surgery.  Denies history of PE DVT or clotting disorder.  States that she has never had something like this previously.  Chart review indicates that she has seen her primary doctor twice in the last 2 months for right-sided rib pain that radiates to the back.  She is actually scheduled for physical therapy for that issue.  She denies any associated nausea vomiting diaphoresis.  Denies lightheadedness dizziness numbness weakness.  Has had extensive gallbladder workup recently.  No known injury or trauma.  No other complaint.  Has not taken anything for relief.              Patient History   Past Medical History:   Diagnosis Date    Conjunctivitis 2023    Contact with and (suspected) exposure to covid-19 2023    Ear lump 2023    Hyperlipidemia     Hypertension      Past Surgical History:   Procedure Laterality Date    CATARACT EXTRACTION Left 2024     Family History   Problem Relation Name Age of Onset    Breast cancer Mother's Sister       Social History     Tobacco Use    Smoking status: Former     Types: Cigarettes     Start date: 2000     Quit date: 1973     Years since quittin.0     Passive exposure: Past    Smokeless tobacco: Never   Vaping Use    Vaping status: Never Used   Substance Use Topics    Alcohol use: Yes     Comment: rarely    Drug use: Never       Physical Exam   ED Triage Vitals   Temp Pulse Resp BP   -- -- -- --      SpO2 Temp src Heart Rate Source Patient Position   -- -- -- --      BP Location FiO2 (%)     -- --       Physical Exam  Vitals  and nursing note reviewed.   Constitutional:       Appearance: She is well-developed.   Cardiovascular:      Rate and Rhythm: Normal rate and regular rhythm.      Heart sounds: Normal heart sounds.   Pulmonary:      Effort: Pulmonary effort is normal.      Breath sounds: Normal breath sounds.   Abdominal:      Palpations: Abdomen is soft.      Comments: No abdominal tenderness, soft, no rebound or guarding   Musculoskeletal:         General: Normal range of motion.      Cervical back: Normal range of motion.   Skin:     General: Skin is warm and dry.   Neurological:      General: No focal deficit present.      Mental Status: She is alert and oriented to person, place, and time.   Psychiatric:         Mood and Affect: Mood normal.           ED Course & MDM   ED Course as of 01/20/25 1941   Mon Jan 20, 2025   1655 EKG interpreted by myself independently, EKG shows a normal sinus rhythm, 85 bpm, CO interval 162, QRS 68, , QTc 416, patient has no ST elevation depression, negative for acute MI. [RAFA]      ED Course User Index  [RAFA] Matthew Moreno, DO         Diagnoses as of 01/20/25 1941   Acute chest pain   Leg cramp   Acute cystitis without hematuria                 No data recorded                                 Medical Decision Making  I have seen and evaluated this patient.  The attending physician has also seen and evaluated this patient.  Vital signs, laboratory testing and diagnostic images if applicable have been reviewed.  All laboratory and imaging is interpreted by myself unless otherwise stated.  Radiology studies are also formally interpreted by radiologist.    CBC without significant leukocytosis or anemia, metabolic panel without significant renal impairment or electrolyte abnormality.  Troponin within an appropriate range without significant delta change, chest x-ray without actionable cardiopulmonary process, EKG without evidence of acute ischemia.  Dimer negative, lower extremity ultrasound  negative.  Chart review indicates patient has actually had this issue for several months.  She will be discharged to continue outpatient workup.  Additionally given cardiology follow-up.  Released in stable condition from emergent standpoint.    Labs Reviewed  COMPREHENSIVE METABOLIC PANEL - Abnormal     Glucose                       112 (*)                Sodium                        138                    Potassium                     3.7                    Chloride                      102                    Bicarbonate                   29                     Anion Gap                     11                     Urea Nitrogen                 11                     Creatinine                    0.84                   eGFR                          78                     Calcium                       9.3                    Albumin                       4.3                    Alkaline Phosphatase          62                     Total Protein                 7.1                    AST                           21                     Bilirubin, Total              0.5                    ALT                           <3 (*)              URINALYSIS WITH REFLEX CULTURE AND MICROSCOPIC - Abnormal     Color, Urine                                         Appearance, Urine             Clear                  Specific Gravity, Urine       1.010                  pH, Urine                     6.5                    Protein, Urine                NEGATIVE                Glucose, Urine                Normal                 Blood, Urine                  NEGATIVE                Ketones, Urine                NEGATIVE                Bilirubin, Urine              NEGATIVE                Urobilinogen, Urine           Normal                 Nitrite, Urine                NEGATIVE                Leukocyte Esterase, Urine     250 Luis/uL (*)            MICROSCOPIC ONLY, URINE - Abnormal     WBC, Urine                    6-10 (*)                RBC, Urine                    3-5                    Squamous Epithelial Cells, Urine                          Bacteria, Urine               1+ (*)              B-TYPE NATRIURETIC PEPTIDE - Normal     BNP                           39                       Narrative:    <100 pg/mL - Heart failure unlikely                100-299 pg/mL - Intermediate probability of acute heart                                failure exacerbation. Correlate with clinical                                context and patient history.                  >=300 pg/mL - Heart Failure likely. Correlate with clinical                                context and patient history.                                BNP testing is performed using different testing methodology at [facility] than at other [facility]. Direct result comparisons should only be made within the same method.                   SERIAL TROPONIN-INITIAL - Normal     Troponin I, High Sensitivity   3                        Narrative: Less than 99th percentile of normal range cutoff-                Female and children under 18 years old <14 ng/L; Male <21 ng/L: Negative                Repeat testing should be performed if clinically indicated.                                 Female and children under 18 years old 14-50 ng/L; Male 21-50 ng/L:                Consistent with possible cardiac damage and possible increased clinical                 risk. Serial measurements may help to assess extent of myocardial damage.                                 >50 ng/L: Consistent with cardiac damage, increased clinical risk and                myocardial infarction. Serial measurements may help assess extent of                 myocardial damage.                                  NOTE: Children less than 1 year old may have higher baseline troponin                 levels and results should be interpreted in conjunction with the overall                 clinical context.                                  NOTE: Troponin I testing is performed using a different                 testing methodology at [facility] than at other                 [facility] hospitals. Direct result comparisons should only                 be made within the same method.  D-DIMER, NON VTE - Normal     D-Dimer Non VTE, Quant (mg/L FEU)   0.35                     Narrative: THROMBOEMBOLIC EVENTS CANNOT BE EXCLUDED SOLELY ON THE BASIS OF THE D-DIMER LEVEL BEING WITHIN THE NORMAL REFERENCE RANGE. D-DIMER LEVELS LESS THAN 0.5 MG/L FEU IN CONJUNCTION WITH A LOW CLINICAL PROBABILITY HAVE AN EXCELLENT NEGATIVE PREDICTIVE VALUE IN EXCLUDING A DIAGNOSIS OF PULMONARY EMBOLUS (PE) OR DEEP VEIN THROMBOSIS (DVT). ELEVATED D-DIMER LEVELS ARE NOT SPECIFIC TO PE OR DVT, AND MAY BE SEEN IN PATIENTS WITH DIC, ADVANCED AGE, PREGNANCY, MALIGNANCY, LIVER DISEASE, INFECTION, AND INFLAMMATORY CONDITIONS AMONG OTHERS. D-DIMER LEVELS MAY BE DECREASED IN PATIENTS RECEIVING ANTI-COAGULATION THERAPY.  SERIAL TROPONIN, 1 HOUR - Normal     Troponin I, High Sensitivity   <3                       Narrative: Less than 99th percentile of normal range cutoff-                Female and children under 18 years old <14 ng/L; Male <21 ng/L: Negative                Repeat testing should be performed if clinically indicated.                                 Female and children under 18 years old 14-50 ng/L; Male 21-50 ng/L:                Consistent with possible cardiac damage and possible increased clinical                 risk. Serial measurements may help to assess extent of myocardial damage.                                 >50 ng/L: Consistent with cardiac damage, increased clinical risk and                myocardial infarction. Serial measurements may help assess extent of                 myocardial damage.                                  NOTE: Children less than 1 year old may have higher baseline troponin                 levels and results should be  interpreted in conjunction with the overall                 clinical context.                                 NOTE: Troponin I testing is performed using a different                 testing methodology at [facility] than at other                 [facility]. Direct result comparisons should only                 be made within the same method.  SARS-COV-2 AND INFLUENZA A/B PCR - Normal     Flu A Result                                         Flu B Result                                         Coronavirus 2019, PCR                                  Narrative: This assay has received FDA Emergency Use Authorization (EUA) and  is only authorized for the duration of time that circumstances exist to justify the authorization of the emergency use of in vitro diagnostic tests for the detection of SARS-CoV-2 virus and/or diagnosis of COVID-19 infection under section 564(b)(1) of the Act, 21 U.S.C. 360bbb-3(b)(1). Testing for SARS-CoV-2 is only recommended for patients who meet current clinical and/or epidemiological criteria as defined by federal, state, or local public health directives. This assay is an in vitro diagnostic nucleic acid amplification test for the qualitative detection of SARS-CoV-2, Influenza A, and Influenza B from nasopharyngeal specimens and has been validated for use at [facility]. Negative results do not preclude COVID-19 infections or Influenza A/B infections, and should not be used as the sole basis for diagnosis, treatment, or other management decisions. If Influenza A/B and RSV PCR results are negative, testing for Parainfluenza virus, Adenovirus and Metapneumovirus is routinely performed for [facility] pediatric oncology and intensive care inpatients, and is available on other patients by placing an add-on request.   URINE CULTURE  CBC WITH AUTO DIFFERENTIAL     WBC                           7.0                    nRBC                          0.0                     RBC                           4.60                   Hemoglobin                    13.9                   Hematocrit                    42.4                   MCV                           92                     MCH                           30.2                   MCHC                          32.8                   RDW                           12.3                   Platelets                     314                    Neutrophils %                 40.2                   Immature Granulocytes %, Automated   0.1                    Lymphocytes %                 45.5                   Monocytes %                   9.2                    Eosinophils %                 4.0                    Basophils %                   1.0                    Neutrophils Absolute          2.82                   Immature Granulocytes Absolute, Au*   0.01                   Lymphocytes Absolute          3.20                   Monocytes Absolute            0.65                   Eosinophils Absolute          0.28                   Basophils Absolute            0.07                TROPONIN SERIES- (INITIAL, 1 HR)       Narrative: The following orders were created for panel order Troponin Series, (0, 1 HR).                Procedure                               Abnormality         Status                                   ---------                               -----------         ------                                   Troponin I, High Sensiti...[[id number]]  Normal              Final result                             Troponin, High Sensitivi...[[id number]]  Normal              Final result                                             Please view results for these tests on the individual orders.  URINALYSIS WITH REFLEX CULTURE AND MICROSCOPIC       Narrative: The following orders were created for panel order Urinalysis with Reflex Culture and Microscopic.                Procedure                               Abnormality         Status                                    ---------                               -----------         ------                                   Urinalysis with Reflex C...[[id number]]  Abnormal            Final result                             Extra Urine Gray Tube[[id number]]                                                                                     Please view results for these tests on the individual orders.  EXTRA URINE GRAY TUBE  XR chest 2 views   Final Result    1.  No acute findings on two-view chest.    Signed by Tan Galvan MD     Vascular US lower extremity venous duplex bilateral   Final Result    Negative study.  No deep venous thrombosis of the  bilateral lower    extremity.          MACRO:    None          Signed by: Lm Figueroa 1/20/2025 6:02 PM    Dictation workstation:   [id number]     Medications  cephalexin (Keflex) capsule 500 mg (has no administration in time range)  Current Discharge Medication List    START taking these medications    cephalexin (Keflex) 500 mg capsule  Take 1 capsule (500 mg) by mouth 2 times a day for 5 days.  Qty: 10 capsule Refills: 0  Associated Diagnoses:Acute cystitis without hematuria                  Procedure  Procedures     Bubba Roberts PA-C  01/20/25 1941

## 2025-01-20 NOTE — ED PROVIDER NOTES
THIS IS MY MEI SUPERVISORY AND SHARED VISIT NOTE:    I personally saw the patient and made/approved the management plan and take responsibility for the patient management.    History: Patient is a 64-year-old female presenting with a chief complaint of chest pain.  Patient states about 1 hour prior to arrival, he started having dull pain in her both of her breast, goes around to her back, she feels like she has nausea, felt jittery had some palpitations, no other acute complaints at this time.    Exam: GENERAL APPEARANCE: Awake and alert.     HEENT: Normocephalic, atraumatic. Extraocular muscles are intact. Pupils equal round and reactive to light.  CHEST: Nontender to palpation. Clear to auscultation bilaterally. No rales, rhonchi, or wheezing.   HEART: S1, S2. Regular rate and rhythm. No murmurs, gallops or rubs.  Strong and equal pulses in the extremities.   ABDOMEN: Soft,.  non-tender.  No rebound or guarding, bowel sounds normal x 4 quadrants  NEUROLOGICAL: Awake, alert and oriented x 3.       MDM: Patient seen and evaluated at bedside, patient is in no acute distress.  I will order a CBC, CMP, D-dimer, urinalysis, troponin, EKG, ultrasound, x-ray chest. Differential diagnosis includes but is not limited to ACS, pneumonia, COVID, flu.  Patient's laboratory analysis shows a UTI with leukocyte esterase, white blood cells and bacteria, troponin 3 and less than 3, BNP 39, glucose 112, patient D-dimer not elevated, CBC shows a hemoglobin of 13.9, hematocrit of 42.4, patient's imaging shows no acute findings on chest x-ray on my interpretation, formal report listed below, ultrasound findings as below.  Patient given a dose of antibiotic prior to [place], given a course of cephalexin at discharge, vies follow-up with her primary care provider, as well as her physical therapy appointment for her leg swelling and pain.  Return precautions were discussed with patient, she is agreeable this discharge plan.  No  Diagnosis:  Chronic illness UTI, chest discomfort, leg cramp  XR chest 2 views   Final Result   1.  No acute findings on two-view chest.   Signed by Tan Galvan MD      Vascular US lower extremity venous duplex bilateral   Final Result   Negative study.  No deep venous thrombosis of the  bilateral lower   extremity.        MACRO:   None        Signed by: Lm Figueroa 1/20/2025 6:02 PM   Dictation workstation:   [id number]        Results for orders placed or performed during the hospital encounter of 01/20/25   CBC and Auto Differential    Collection Time: 01/20/25  5:17 PM   Result Value Ref Range    WBC 7.0 4.4 - 11.3 x10*3/uL    nRBC 0.0 0.0 - 0.0 /100 WBCs    RBC 4.60 4.00 - 5.20 x10*6/uL    Hemoglobin 13.9 12.0 - 16.0 g/dL    Hematocrit 42.4 36.0 - 46.0 %    MCV 92 80 - 100 fL    MCH 30.2 26.0 - 34.0 pg    MCHC 32.8 32.0 - 36.0 g/dL    RDW 12.3 11.5 - 14.5 %    Platelets 314 150 - 450 x10*3/uL    Neutrophils % 40.2 40.0 - 80.0 %    Immature Granulocytes %, Automated 0.1 0.0 - 0.9 %    Lymphocytes % 45.5 13.0 - 44.0 %    Monocytes % 9.2 2.0 - 10.0 %    Eosinophils % 4.0 0.0 - 6.0 %    Basophils % 1.0 0.0 - 2.0 %    Neutrophils Absolute 2.82 1.20 - 7.70 x10*3/uL    Immature Granulocytes Absolute, Automated 0.01 0.00 - 0.70 x10*3/uL    Lymphocytes Absolute 3.20 1.20 - 4.80 x10*3/uL    Monocytes Absolute 0.65 0.10 - 1.00 x10*3/uL    Eosinophils Absolute 0.28 0.00 - 0.70 x10*3/uL    Basophils Absolute 0.07 0.00 - 0.10 x10*3/uL   Comprehensive metabolic panel    Collection Time: 01/20/25  5:17 PM   Result Value Ref Range    Glucose 112 (H) 74 - 99 mg/dL    Sodium 138 136 - 145 mmol/L    Potassium 3.7 3.5 - 5.3 mmol/L    Chloride 102 98 - 107 mmol/L    Bicarbonate 29 21 - 32 mmol/L    Anion Gap 11 10 - 20 mmol/L    Urea Nitrogen 11 6 - 23 mg/dL    Creatinine 0.84 0.50 - 1.05 mg/dL    eGFR 78 >60 mL/min/1.73m*2    Calcium 9.3 8.6 - 10.3 mg/dL    Albumin 4.3 3.4 - 5.0 g/dL    Alkaline Phosphatase 62 33 - 136 U/L    Total Protein 7.1 6.4 -  8.2 g/dL    AST 21 9 - 39 U/L    Bilirubin, Total 0.5 0.0 - 1.2 mg/dL    ALT <3 (L) 7 - 45 U/L   B-type natriuretic peptide    Collection Time: 01/20/25  5:17 PM   Result Value Ref Range    BNP 39 0 - 99 pg/mL   Troponin I, High Sensitivity, Initial    Collection Time: 01/20/25  5:17 PM   Result Value Ref Range    Troponin I, High Sensitivity 3 0 - 13 ng/L   D-dimer, quantitative    Collection Time: 01/20/25  5:17 PM   Result Value Ref Range    D-Dimer Non VTE, Quant (mg/L FEU) 0.35 0.19 - 0.50 mg/L FEU   Urinalysis with Reflex Culture and Microscopic    Collection Time: 01/20/25  5:25 PM   Result Value Ref Range    Color, Urine Light-Yellow Light-Yellow, Yellow, Dark-Yellow    Appearance, Urine Clear Clear    Specific Gravity, Urine 1.010 1.005 - 1.035    pH, Urine 6.5 5.0, 5.5, 6.0, 6.5, 7.0, 7.5, 8.0    Protein, Urine NEGATIVE NEGATIVE, 10 (TRACE), 20 (TRACE) mg/dL    Glucose, Urine Normal Normal mg/dL    Blood, Urine NEGATIVE NEGATIVE    Ketones, Urine NEGATIVE NEGATIVE mg/dL    Bilirubin, Urine NEGATIVE NEGATIVE    Urobilinogen, Urine Normal Normal mg/dL    Nitrite, Urine NEGATIVE NEGATIVE    Leukocyte Esterase, Urine 250 Luis/uL (A) NEGATIVE   Microscopic Only, Urine    Collection Time: 01/20/25  5:25 PM   Result Value Ref Range    WBC, Urine 6-10 (A) 1-5, NONE /HPF    RBC, Urine 3-5 NONE, 1-2, 3-5 /HPF    Squamous Epithelial Cells, Urine 1-9 (SPARSE) Reference range not established. /HPF    Bacteria, Urine 1+ (A) NONE SEEN /HPF   Troponin, High Sensitivity, 1 Hour    Collection Time: 01/20/25  6:23 PM   Result Value Ref Range    Troponin I, High Sensitivity <3 0 - 13 ng/L   Sars-CoV-2 and Influenza A/B PCR    Collection Time: 01/20/25  6:23 PM   Result Value Ref Range    Flu A Result Not Detected Not Detected    Flu B Result Not Detected Not Detected    Coronavirus 2019, PCR Not Detected Not Detected         Please see MEI note for further details    Sections of this report were created using voice-to-text  technology and may contain errors in translation    Matthew Moreno DO  Emergency Medicine       Matthew Moreno DO  01/20/25 1954

## 2025-01-20 NOTE — Clinical Note
Nell Goff was seen and treated in our emergency department on 1/20/2025.  She may return to work on 01/21/2025.       If you have any questions or concerns, please don't hesitate to call.      Matthew Moreno, DO

## 2025-01-21 LAB
ATRIAL RATE: 85 BPM
P AXIS: 35 DEGREES
P OFFSET: 182 MS
P ONSET: 140 MS
PR INTERVAL: 162 MS
Q ONSET: 221 MS
QRS COUNT: 14 BEATS
QRS DURATION: 68 MS
QT INTERVAL: 350 MS
QTC CALCULATION(BAZETT): 416 MS
QTC FREDERICIA: 393 MS
R AXIS: 57 DEGREES
T AXIS: 66 DEGREES
T OFFSET: 396 MS
VENTRICULAR RATE: 85 BPM

## 2025-01-21 NOTE — DISCHARGE INSTRUCTIONS
You are seen today for chest discomfort, as well as a urinary tract infection, given antibiotics OT twice a day for the next 5 days, please follow-up with your primary care provider, please attend your physical therapy appointment, please return ER for worsening symptoms.    Thank you for choosing [facility]. It was my pleasure to be involved in your care today.         As of today's visit, based on reasonable likelihood, that it is safe for you to be discharged back to your residence to follow-up as an outpatient for ongoing management of your medical problem. You should follow-up with any referrals / primary provider as soon as possible. The contacts (number, addresses) are listed below.         Important:  Even though we think it is safe for you to go home, there is always a small chance that we are missing something that could require hospitalization.  Therefore it is very important that if you get worse or develops any new symptoms that you return here as soon as possible to be re-evaluated.  This includes return of symptoms that have resolved such as fainting, chest pain, or symptoms that could be warning signs for stroke important:  Even though we think it is safe for you to go home, there is always a small chance that we are missing something that could require hospitalization.  Therefore it is very important that if you get worse or develops any new symptoms that you return here as soon as possible to be re-evaluated.  This includes return of symptoms that have resolved such as fainting, chest pain, or symptoms that could be warning signs for stroke         Make sure your pharmacy and primary doctor is aware of any new medications prescribed today.          It is your responsibility to contact as soon as possible, and follow through with, any referrals you were given today. We do recommend you inform them you are a [facility] ER follow-up patient, as often they can better accommodate  your need to be seen, provided their schedules allow. We will, and have, made every effort to ensure you have access to adequate follow-up specialists available.          All problems may not be able to be fixed in one ER visit. This is why timely ongoing care is important, and this is a responsibility you share in. Further, you are free to follow up with any provider you choose, and this is not limited to our suggestion.          If cultures were obtained today, you will be contacted should anything result that would require further treatment. Please contact the ED at the number provided with questions.          Having trouble affording medications? Try [email or web address]! (This is not a hospital endorsed website, merely a recommendation based on my own personal experiences with [employer])

## 2025-01-22 LAB — BACTERIA UR CULT: NORMAL

## 2025-01-23 ENCOUNTER — APPOINTMENT (OUTPATIENT)
Dept: PHYSICAL THERAPY | Facility: CLINIC | Age: 65
End: 2025-01-23
Payer: COMMERCIAL

## 2025-03-08 ENCOUNTER — HOSPITAL ENCOUNTER (OUTPATIENT)
Dept: RADIOLOGY | Facility: HOSPITAL | Age: 65
Discharge: HOME | End: 2025-03-08
Payer: COMMERCIAL

## 2025-03-08 DIAGNOSIS — M54.6 RIGHT-SIDED THORACIC BACK PAIN, UNSPECIFIED CHRONICITY: ICD-10-CM

## 2025-03-08 PROCEDURE — 72072 X-RAY EXAM THORAC SPINE 3VWS: CPT | Performed by: RADIOLOGY

## 2025-03-08 PROCEDURE — 72072 X-RAY EXAM THORAC SPINE 3VWS: CPT

## 2025-03-09 LAB
ALBUMIN SERPL-MCNC: 4.2 G/DL (ref 3.6–5.1)
ALBUMIN/CREAT UR: NORMAL
ALP SERPL-CCNC: 56 U/L (ref 37–153)
ALT SERPL-CCNC: <3 U/L (ref 6–29)
ANION GAP SERPL CALCULATED.4IONS-SCNC: 7 MMOL/L (CALC) (ref 7–17)
APPEARANCE UR: CLEAR
AST SERPL-CCNC: 18 U/L (ref 10–35)
BACTERIA #/AREA URNS HPF: ABNORMAL /HPF
BASOPHILS # BLD AUTO: 49 CELLS/UL (ref 0–200)
BASOPHILS NFR BLD AUTO: 1 %
BILIRUB SERPL-MCNC: 0.5 MG/DL (ref 0.2–1.2)
BILIRUB UR QL STRIP: NEGATIVE
BUN SERPL-MCNC: 11 MG/DL (ref 7–25)
CALCIUM SERPL-MCNC: 9.1 MG/DL (ref 8.6–10.4)
CHLORIDE SERPL-SCNC: 103 MMOL/L (ref 98–110)
CHOLEST SERPL-MCNC: 149 MG/DL
CHOLEST/HDLC SERPL: 3 (CALC)
CO2 SERPL-SCNC: 29 MMOL/L (ref 20–32)
COLOR UR: ABNORMAL
CREAT SERPL-MCNC: 0.8 MG/DL (ref 0.5–1.05)
CREAT UR-MCNC: NORMAL MG/DL
EGFRCR SERPLBLD CKD-EPI 2021: 82 ML/MIN/1.73M2
EOSINOPHIL # BLD AUTO: 230 CELLS/UL (ref 15–500)
EOSINOPHIL NFR BLD AUTO: 4.7 %
ERYTHROCYTE [DISTWIDTH] IN BLOOD BY AUTOMATED COUNT: 12.3 % (ref 11–15)
EST. AVERAGE GLUCOSE BLD GHB EST-MCNC: 128 MG/DL
EST. AVERAGE GLUCOSE BLD GHB EST-SCNC: 7.1 MMOL/L
GLUCOSE SERPL-MCNC: 95 MG/DL (ref 65–99)
GLUCOSE UR QL STRIP: NEGATIVE
HBA1C MFR BLD: 6.1 % OF TOTAL HGB
HCT VFR BLD AUTO: 40.5 % (ref 35–45)
HDLC SERPL-MCNC: 50 MG/DL
HGB BLD-MCNC: 13.3 G/DL (ref 11.7–15.5)
HGB UR QL STRIP: NEGATIVE
HYALINE CASTS #/AREA URNS LPF: ABNORMAL /LPF
KETONES UR QL STRIP: NEGATIVE
LDLC SERPL CALC-MCNC: 79 MG/DL (CALC)
LEUKOCYTE ESTERASE UR QL STRIP: ABNORMAL
LYMPHOCYTES # BLD AUTO: 2264 CELLS/UL (ref 850–3900)
LYMPHOCYTES NFR BLD AUTO: 46.2 %
MCH RBC QN AUTO: 31 PG (ref 27–33)
MCHC RBC AUTO-ENTMCNC: 32.8 G/DL (ref 32–36)
MCV RBC AUTO: 94.4 FL (ref 80–100)
MICROALBUMIN UR-MCNC: NORMAL
MONOCYTES # BLD AUTO: 412 CELLS/UL (ref 200–950)
MONOCYTES NFR BLD AUTO: 8.4 %
NEUTROPHILS # BLD AUTO: 1945 CELLS/UL (ref 1500–7800)
NEUTROPHILS NFR BLD AUTO: 39.7 %
NITRITE UR QL STRIP: NEGATIVE
NONHDLC SERPL-MCNC: 99 MG/DL (CALC)
PH UR STRIP: 6.5 [PH] (ref 5–8)
PLATELET # BLD AUTO: 298 THOUSAND/UL (ref 140–400)
PMV BLD REES-ECKER: 11.8 FL (ref 7.5–12.5)
POTASSIUM SERPL-SCNC: 4.4 MMOL/L (ref 3.5–5.3)
PROT SERPL-MCNC: 6.3 G/DL (ref 6.1–8.1)
PROT UR QL STRIP: NEGATIVE
RBC # BLD AUTO: 4.29 MILLION/UL (ref 3.8–5.1)
RBC #/AREA URNS HPF: ABNORMAL /HPF
SERVICE CMNT-IMP: ABNORMAL
SODIUM SERPL-SCNC: 139 MMOL/L (ref 135–146)
SP GR UR STRIP: 1.02 (ref 1–1.03)
SQUAMOUS #/AREA URNS HPF: ABNORMAL /HPF
TRIGL SERPL-MCNC: 112 MG/DL
WBC # BLD AUTO: 4.9 THOUSAND/UL (ref 3.8–10.8)
WBC #/AREA URNS HPF: ABNORMAL /HPF

## 2025-03-10 ENCOUNTER — APPOINTMENT (OUTPATIENT)
Dept: PRIMARY CARE | Facility: CLINIC | Age: 65
End: 2025-03-10
Payer: COMMERCIAL

## 2025-03-10 VITALS
DIASTOLIC BLOOD PRESSURE: 84 MMHG | SYSTOLIC BLOOD PRESSURE: 130 MMHG | HEIGHT: 64 IN | OXYGEN SATURATION: 98 % | WEIGHT: 179 LBS | HEART RATE: 81 BPM | TEMPERATURE: 98.3 F | BODY MASS INDEX: 30.56 KG/M2

## 2025-03-10 DIAGNOSIS — L65.9 HAIR LOSS: ICD-10-CM

## 2025-03-10 DIAGNOSIS — I10 HYPERTENSION, UNSPECIFIED TYPE: ICD-10-CM

## 2025-03-10 DIAGNOSIS — Z00.00 ANNUAL PHYSICAL EXAM: Primary | ICD-10-CM

## 2025-03-10 DIAGNOSIS — R73.01 IFG (IMPAIRED FASTING GLUCOSE): ICD-10-CM

## 2025-03-10 DIAGNOSIS — Z12.31 ENCOUNTER FOR SCREENING MAMMOGRAM FOR MALIGNANT NEOPLASM OF BREAST: ICD-10-CM

## 2025-03-10 DIAGNOSIS — Z78.0 POSTMENOPAUSAL: ICD-10-CM

## 2025-03-10 DIAGNOSIS — E78.5 HYPERLIPIDEMIA, UNSPECIFIED HYPERLIPIDEMIA TYPE: ICD-10-CM

## 2025-03-10 LAB
25(OH)D3+25(OH)D2 SERPL-MCNC: 42 NG/ML (ref 30–100)
ALBUMIN SERPL-MCNC: 4.2 G/DL (ref 3.6–5.1)
ALBUMIN/CREAT UR: 5 MG/G CREAT
ALP SERPL-CCNC: 56 U/L (ref 37–153)
ALT SERPL-CCNC: <3 U/L (ref 6–29)
ANION GAP SERPL CALCULATED.4IONS-SCNC: 7 MMOL/L (CALC) (ref 7–17)
APPEARANCE UR: CLEAR
AST SERPL-CCNC: 18 U/L (ref 10–35)
BACTERIA #/AREA URNS HPF: ABNORMAL /HPF
BASOPHILS # BLD AUTO: 49 CELLS/UL (ref 0–200)
BASOPHILS NFR BLD AUTO: 1 %
BILIRUB SERPL-MCNC: 0.5 MG/DL (ref 0.2–1.2)
BILIRUB UR QL STRIP: NEGATIVE
BUN SERPL-MCNC: 11 MG/DL (ref 7–25)
CALCIUM SERPL-MCNC: 9.1 MG/DL (ref 8.6–10.4)
CHLORIDE SERPL-SCNC: 103 MMOL/L (ref 98–110)
CHOLEST SERPL-MCNC: 149 MG/DL
CHOLEST/HDLC SERPL: 3 (CALC)
CO2 SERPL-SCNC: 29 MMOL/L (ref 20–32)
COLOR UR: ABNORMAL
CREAT SERPL-MCNC: 0.8 MG/DL (ref 0.5–1.05)
CREAT UR-MCNC: 112 MG/DL (ref 20–275)
EGFRCR SERPLBLD CKD-EPI 2021: 82 ML/MIN/1.73M2
EOSINOPHIL # BLD AUTO: 230 CELLS/UL (ref 15–500)
EOSINOPHIL NFR BLD AUTO: 4.7 %
ERYTHROCYTE [DISTWIDTH] IN BLOOD BY AUTOMATED COUNT: 12.3 % (ref 11–15)
EST. AVERAGE GLUCOSE BLD GHB EST-MCNC: 128 MG/DL
EST. AVERAGE GLUCOSE BLD GHB EST-SCNC: 7.1 MMOL/L
GLUCOSE SERPL-MCNC: 95 MG/DL (ref 65–99)
GLUCOSE UR QL STRIP: NEGATIVE
HBA1C MFR BLD: 6.1 % OF TOTAL HGB
HCT VFR BLD AUTO: 40.5 % (ref 35–45)
HDLC SERPL-MCNC: 50 MG/DL
HGB BLD-MCNC: 13.3 G/DL (ref 11.7–15.5)
HGB UR QL STRIP: NEGATIVE
HYALINE CASTS #/AREA URNS LPF: ABNORMAL /LPF
KETONES UR QL STRIP: NEGATIVE
LDLC SERPL CALC-MCNC: 79 MG/DL (CALC)
LEUKOCYTE ESTERASE UR QL STRIP: ABNORMAL
LYMPHOCYTES # BLD AUTO: 2264 CELLS/UL (ref 850–3900)
LYMPHOCYTES NFR BLD AUTO: 46.2 %
MCH RBC QN AUTO: 31 PG (ref 27–33)
MCHC RBC AUTO-ENTMCNC: 32.8 G/DL (ref 32–36)
MCV RBC AUTO: 94.4 FL (ref 80–100)
MICROALBUMIN UR-MCNC: 0.6 MG/DL
MONOCYTES # BLD AUTO: 412 CELLS/UL (ref 200–950)
MONOCYTES NFR BLD AUTO: 8.4 %
NEUTROPHILS # BLD AUTO: 1945 CELLS/UL (ref 1500–7800)
NEUTROPHILS NFR BLD AUTO: 39.7 %
NITRITE UR QL STRIP: NEGATIVE
NONHDLC SERPL-MCNC: 99 MG/DL (CALC)
PH UR STRIP: 6.5 [PH] (ref 5–8)
PLATELET # BLD AUTO: 298 THOUSAND/UL (ref 140–400)
PMV BLD REES-ECKER: 11.8 FL (ref 7.5–12.5)
POTASSIUM SERPL-SCNC: 4.4 MMOL/L (ref 3.5–5.3)
PROT SERPL-MCNC: 6.3 G/DL (ref 6.1–8.1)
PROT UR QL STRIP: NEGATIVE
RBC # BLD AUTO: 4.29 MILLION/UL (ref 3.8–5.1)
RBC #/AREA URNS HPF: ABNORMAL /HPF
SERVICE CMNT-IMP: ABNORMAL
SODIUM SERPL-SCNC: 139 MMOL/L (ref 135–146)
SP GR UR STRIP: 1.02 (ref 1–1.03)
SQUAMOUS #/AREA URNS HPF: ABNORMAL /HPF
TRIGL SERPL-MCNC: 112 MG/DL
TSH SERPL-ACNC: 1.07 MIU/L (ref 0.4–4.5)
WBC # BLD AUTO: 4.9 THOUSAND/UL (ref 3.8–10.8)
WBC #/AREA URNS HPF: ABNORMAL /HPF

## 2025-03-10 PROCEDURE — 99214 OFFICE O/P EST MOD 30 MIN: CPT | Performed by: STUDENT IN AN ORGANIZED HEALTH CARE EDUCATION/TRAINING PROGRAM

## 2025-03-10 PROCEDURE — 3008F BODY MASS INDEX DOCD: CPT | Performed by: STUDENT IN AN ORGANIZED HEALTH CARE EDUCATION/TRAINING PROGRAM

## 2025-03-10 PROCEDURE — 99396 PREV VISIT EST AGE 40-64: CPT | Performed by: STUDENT IN AN ORGANIZED HEALTH CARE EDUCATION/TRAINING PROGRAM

## 2025-03-10 PROCEDURE — 1036F TOBACCO NON-USER: CPT | Performed by: STUDENT IN AN ORGANIZED HEALTH CARE EDUCATION/TRAINING PROGRAM

## 2025-03-10 PROCEDURE — 3075F SYST BP GE 130 - 139MM HG: CPT | Performed by: STUDENT IN AN ORGANIZED HEALTH CARE EDUCATION/TRAINING PROGRAM

## 2025-03-10 PROCEDURE — 3079F DIAST BP 80-89 MM HG: CPT | Performed by: STUDENT IN AN ORGANIZED HEALTH CARE EDUCATION/TRAINING PROGRAM

## 2025-03-10 RX ORDER — ROSUVASTATIN CALCIUM 10 MG/1
10 TABLET, COATED ORAL DAILY
Qty: 90 TABLET | Refills: 2 | Status: SHIPPED | OUTPATIENT
Start: 2025-03-10

## 2025-03-10 RX ORDER — VIT C/E/ZN/COPPR/LUTEIN/ZEAXAN 250MG-90MG
1000 CAPSULE ORAL
COMMUNITY

## 2025-03-10 RX ORDER — L.ACIDOPHIL,PARACAS,B.ANIMALIS 10B CELL
1 CAPSULE ORAL
COMMUNITY

## 2025-03-10 ASSESSMENT — ENCOUNTER SYMPTOMS
OCCASIONAL FEELINGS OF UNSTEADINESS: 0
DIZZINESS: 0
SHORTNESS OF BREATH: 0
CHEST TIGHTNESS: 0
FEVER: 0
COUGH: 0
LIGHT-HEADEDNESS: 0
DIFFICULTY URINATING: 0
DEPRESSION: 0
CONSTIPATION: 0
DIARRHEA: 0
WEAKNESS: 0
PALPITATIONS: 0
CHILLS: 0
HEADACHES: 0
LOSS OF SENSATION IN FEET: 0
ABDOMINAL PAIN: 1
BLOOD IN STOOL: 0
TROUBLE SWALLOWING: 0
WHEEZING: 0

## 2025-03-10 ASSESSMENT — PAIN SCALES - GENERAL: PAINLEVEL_OUTOF10: 0-NO PAIN

## 2025-03-10 NOTE — PROGRESS NOTES
Subjective   Patient ID: Nell Goff is a 64 y.o. female who presents for Annual Exam (Labs completed 2025/EKG- 2025/Bone density-/Colonoscopy- /Mammogram-2024)    CHRONIC CONDITIONS:  HTN- losartan 50 mg daily, BP today 130/84. Home readings - 120-130's/80's  HLD - rosuvastatin 5 mg daily  GERD - now under Dr. Melendez. Trying to get insurance for CTA to assess vascular etiology for her epigastric pain. Has pain after eating and with exercise.  IFG- A1C 6.1%, FBG 95    Social Hx:  , 1 adult child  Not currently working, job interview today  Smoking: No- Former   Alcohol: No  Recreational drug use: No    Health Maintenance  Immunizations:     Tdap     Pneumococcal - discussed    Shingles discussed    COVID discussed- no plans for updated COVID vaccine, but may consider    Influenza declines    Colonoscopy: 2015, due  -- will have through Dr. Melendez   Lung cancer screening: NA    Women's Health  Postmenopausal: yes  -No PMB  H/o Gyn Surgery - no  Last Pap: ~ , will schedule with gynecology - will see [facility] - has seen in the past  History of abnormal pap: no  Last mammogram:  2024  History of abnormal mammogram: no   Bone Density: years ago, start age 65    OB History          1    Para   1    Term   1            AB        Living             SAB        IAB        Ectopic        Multiple        Live Births                     Current Outpatient Medications   Medication Instructions    cholecalciferol (VITAMIN D-3) 1,000 Units, Daily RT    L.acidoph,paracasei,B.animalis (Digestive Advantage Advanced) 10 billion cell capsule 1 capsule, Daily RT    losartan (COZAAR) 50 mg, oral, Daily    naproxen (Naprosyn) 500 mg tablet TAKE 1 TABLET BY MOUTH 2 TIMES A DAY IN THE MORNING AND AFTERNOON FOR 10 DAYS    rosuvastatin (CRESTOR) 5 mg, oral, Daily     Allergies   Allergen Reactions    Feathers Hives and Unknown    Mold Other     Runny nose   Other reaction(s):  Intolerance    Nutritional Therapy, Milk Protein, Special Formula GI Upset    Corn Containing Products Itching and Rash    Milk Other and Rash       Immunization History   Administered Date(s) Administered    Flu vaccine, quadrivalent, recombinant, preservative free, adult (FLUBLOK) 10/11/2021    Hepatitis B vaccine, adult *Check Product/Dose* 2007, 2007, 2007    Influenza, Unspecified 12/10/2022    MMR vaccine, subcutaneous (MMR II) 2007    Moderna COVID-19 vaccine, bivalent, blue cap/gray label *Check age/dose* 2022    Moderna SARS-CoV-2 Vaccination 03/10/2021, 2021, 2021    Tdap vaccine, age 7 year and older (BOOSTRIX, ADACEL) 2007, 2022     Past Surgical History:   Procedure Laterality Date    CATARACT EXTRACTION Left 2024     Family History   Problem Relation Name Age of Onset    Breast cancer Mother's Sister       Social History     Tobacco Use    Smoking status: Former     Types: Cigarettes     Start date: 2000     Quit date: 1973     Years since quittin.2     Passive exposure: Past    Smokeless tobacco: Never   Vaping Use    Vaping status: Never Used   Substance Use Topics    Alcohol use: Yes     Comment: rarely    Drug use: Never       Review of Systems   Constitutional:  Negative for chills and fever.   HENT:  Negative for trouble swallowing.    Eyes:  Negative for visual disturbance.   Respiratory:  Negative for cough, chest tightness, shortness of breath and wheezing.    Cardiovascular:  Negative for chest pain and palpitations.   Gastrointestinal:  Positive for abdominal pain (chronic). Negative for blood in stool, constipation and diarrhea.   Genitourinary:  Negative for difficulty urinating and vaginal bleeding.   Neurological:  Negative for dizziness, weakness, light-headedness and headaches.       Objective   Visit Vitals  /84 (BP Location: Left arm, Patient Position: Sitting, BP Cuff Size: Adult)   Pulse 81   Temp 36.8 °C  "(98.3 °F) (Temporal)   Ht 1.626 m (5' 4\")   Wt 81.2 kg (179 lb)   SpO2 98%   BMI 30.73 kg/m²   OB Status Postmenopausal   Smoking Status Former   BSA 1.92 m²       Physical Exam  Constitutional:       Appearance: Normal appearance.   HENT:      Head: Normocephalic and atraumatic.      Right Ear: Tympanic membrane, ear canal and external ear normal.      Left Ear: Tympanic membrane, ear canal and external ear normal.      Mouth/Throat:      Mouth: Mucous membranes are moist.      Pharynx: Oropharynx is clear.   Eyes:      Extraocular Movements: Extraocular movements intact.      Conjunctiva/sclera: Conjunctivae normal.      Pupils: Pupils are equal, round, and reactive to light.   Cardiovascular:      Rate and Rhythm: Normal rate and regular rhythm.      Pulses: Normal pulses.      Heart sounds: Normal heart sounds. No murmur heard.  Pulmonary:      Effort: Pulmonary effort is normal.      Breath sounds: Normal breath sounds. No wheezing, rhonchi or rales.   Abdominal:      General: Abdomen is flat.      Palpations: Abdomen is soft.      Tenderness: There is no abdominal tenderness.   Musculoskeletal:         General: Normal range of motion.      Cervical back: Neck supple.   Skin:     General: Skin is warm and dry.   Neurological:      Mental Status: She is alert.   Psychiatric:         Mood and Affect: Mood normal.         Behavior: Behavior normal.         ASSESSMENT AND PLAN  1. Annual physical exam (Primary)  Well adult exam.  1. Age appropriate preventative measures reviewed.   2. Encouraged healthy diet and exercise.  3. Immunizations- Reviewed and discussed  4. Labs- Reviewed and discussed with patient  5. Medications- Reviewed      2. Hypertension, unspecified type  Controlled but no optimally with goal <130/80. Will increase to losartan 100 mg daily. To continue home monitoring. Proper technique reviewed.     3. Hyperlipidemia, unspecified hyperlipidemia type  Lipid panel reviewed. Continue medication. " Continue with lifestyle interventions including healthy diet  that includes lean proteins, vegetables, fruits, and whole grains.  Limit saturated fats, excess sodium, and processed foods.  Limit sugary drinks and sweets.  Moderate exercise at least 30 minutes per day, 5 days per week.  - rosuvastatin (Crestor) 10 mg tablet; Take 1 tablet (10 mg) by mouth once daily.  Dispense: 90 tablet; Refill: 2    4. IFG (impaired fasting glucose)  Fasting glucose and A1c reviewed. Diet and exercise recommendations discussed.     5. Hair loss  - TSH with reflex to Free T4 if abnormal; Future  - Vitamin D 25-Hydroxy,Total (for eval of Vitamin D levels); Future  - TSH with reflex to Free T4 if abnormal  - Vitamin D 25-Hydroxy,Total (for eval of Vitamin D levels)    6. Postmenopausal  - XR DEXA bone density; Future      7. Encounter for screening mammogram for malignant neoplasm of breast  - BI mammo bilateral screening tomosynthesis; Future

## 2025-03-12 ENCOUNTER — TELEPHONE (OUTPATIENT)
Dept: PRIMARY CARE | Facility: CLINIC | Age: 65
End: 2025-03-12
Payer: COMMERCIAL

## 2025-03-12 DIAGNOSIS — I10 HYPERTENSION, UNSPECIFIED TYPE: ICD-10-CM

## 2025-03-12 NOTE — TELEPHONE ENCOUNTER
There is not a middle dose but can continue taking a 1.5 pills or can go back down to 1 pill daily and continue monitoring blood pressure..

## 2025-03-12 NOTE — TELEPHONE ENCOUNTER
Patient was seen 03/10 by SJB. Losartan was discussed to be increased from 50 mg to 100 mg. Patient was wondering if there was an in the middle dosage increase like 75 mg? Her BP reading today 03/12 was 123/65, and it was about the same yesterday as well. This is with taking a pill and a half. Please advise

## 2025-03-13 RX ORDER — LOSARTAN POTASSIUM 50 MG/1
50 TABLET ORAL DAILY
Qty: 90 TABLET | Refills: 1 | Status: SHIPPED | OUTPATIENT
Start: 2025-03-13

## 2025-03-18 ENCOUNTER — APPOINTMENT (OUTPATIENT)
Dept: RADIOLOGY | Facility: HOSPITAL | Age: 65
End: 2025-03-18
Payer: COMMERCIAL

## 2025-03-18 ENCOUNTER — HOSPITAL ENCOUNTER (OUTPATIENT)
Dept: RADIOLOGY | Facility: HOSPITAL | Age: 65
Discharge: HOME | End: 2025-03-18
Payer: COMMERCIAL

## 2025-03-18 DIAGNOSIS — Z78.0 POSTMENOPAUSAL: ICD-10-CM

## 2025-03-18 PROCEDURE — 77080 DXA BONE DENSITY AXIAL: CPT | Performed by: RADIOLOGY

## 2025-03-18 PROCEDURE — 77080 DXA BONE DENSITY AXIAL: CPT

## 2025-03-22 ENCOUNTER — APPOINTMENT (OUTPATIENT)
Dept: RADIOLOGY | Facility: HOSPITAL | Age: 65
End: 2025-03-22
Payer: COMMERCIAL

## 2025-03-24 ENCOUNTER — HOSPITAL ENCOUNTER (OUTPATIENT)
Dept: RADIOLOGY | Facility: HOSPITAL | Age: 65
Discharge: HOME | End: 2025-03-24
Payer: COMMERCIAL

## 2025-03-24 VITALS — HEIGHT: 64 IN | BODY MASS INDEX: 30.56 KG/M2 | WEIGHT: 179 LBS

## 2025-03-24 DIAGNOSIS — Z12.31 ENCOUNTER FOR SCREENING MAMMOGRAM FOR MALIGNANT NEOPLASM OF BREAST: ICD-10-CM

## 2025-03-24 PROCEDURE — 77063 BREAST TOMOSYNTHESIS BI: CPT

## 2025-03-24 PROCEDURE — 77063 BREAST TOMOSYNTHESIS BI: CPT | Performed by: STUDENT IN AN ORGANIZED HEALTH CARE EDUCATION/TRAINING PROGRAM

## 2025-03-24 PROCEDURE — 77067 SCR MAMMO BI INCL CAD: CPT | Performed by: STUDENT IN AN ORGANIZED HEALTH CARE EDUCATION/TRAINING PROGRAM

## 2025-04-23 ENCOUNTER — TELEPHONE (OUTPATIENT)
Dept: PRIMARY CARE | Facility: CLINIC | Age: 65
End: 2025-04-23
Payer: COMMERCIAL

## 2025-04-23 DIAGNOSIS — I10 HYPERTENSION, UNSPECIFIED TYPE: ICD-10-CM

## 2025-04-23 NOTE — TELEPHONE ENCOUNTER
Patient called Rx line and requested a refill for Losartan 50 mg. It was only filled for 30 tablets by the pharmacy from 3/13/2025, and the pharmacy is claiming that there are no refills. Last ov 3/10/2025.  Pharmacy: [facility] - [address]

## 2025-04-24 NOTE — TELEPHONE ENCOUNTER
Telephone call to patient to follow up on referral for urgent psych. Was unable to reach patient LM for return call.

## 2025-04-25 RX ORDER — LOSARTAN POTASSIUM 50 MG/1
75 TABLET ORAL DAILY
Qty: 135 TABLET | Refills: 2 | Status: SHIPPED | OUTPATIENT
Start: 2025-04-25 | End: 2025-04-29 | Stop reason: SDUPTHER

## 2025-04-28 DIAGNOSIS — I10 HYPERTENSION, UNSPECIFIED TYPE: ICD-10-CM

## 2025-04-28 NOTE — TELEPHONE ENCOUNTER
Patient is calling in for a refill on Losartan 50 mg tabs.  Patient called [facility] and they never got the scripts.  Please resend.  Last seen 03/10/25

## 2025-04-29 RX ORDER — LOSARTAN POTASSIUM 50 MG/1
75 TABLET ORAL DAILY
Qty: 135 TABLET | Refills: 2 | Status: SHIPPED | OUTPATIENT
Start: 2025-04-29 | End: 2025-07-28

## 2025-08-27 ENCOUNTER — OFFICE VISIT (OUTPATIENT)
Dept: PRIMARY CARE | Facility: CLINIC | Age: 65
End: 2025-08-27
Payer: COMMERCIAL

## 2025-08-27 VITALS
DIASTOLIC BLOOD PRESSURE: 88 MMHG | BODY MASS INDEX: 30.39 KG/M2 | HEART RATE: 71 BPM | HEIGHT: 64 IN | SYSTOLIC BLOOD PRESSURE: 148 MMHG | OXYGEN SATURATION: 95 % | WEIGHT: 178 LBS

## 2025-08-27 DIAGNOSIS — I10 HYPERTENSION, UNSPECIFIED TYPE: ICD-10-CM

## 2025-08-27 DIAGNOSIS — M25.552 LEFT HIP PAIN: Primary | ICD-10-CM

## 2025-08-27 RX ORDER — LOSARTAN POTASSIUM 50 MG/1
100 TABLET ORAL DAILY
Qty: 180 TABLET | Refills: 1 | Status: SHIPPED | OUTPATIENT
Start: 2025-08-27 | End: 2025-11-25

## 2025-08-27 ASSESSMENT — PAIN SCALES - GENERAL: PAINLEVEL_OUTOF10: 4

## 2025-09-08 ENCOUNTER — APPOINTMENT (OUTPATIENT)
Dept: PRIMARY CARE | Facility: CLINIC | Age: 65
End: 2025-09-08
Payer: COMMERCIAL